# Patient Record
Sex: MALE | Race: BLACK OR AFRICAN AMERICAN | NOT HISPANIC OR LATINO | URBAN - METROPOLITAN AREA
[De-identification: names, ages, dates, MRNs, and addresses within clinical notes are randomized per-mention and may not be internally consistent; named-entity substitution may affect disease eponyms.]

---

## 2018-03-14 ENCOUNTER — EMERGENCY (EMERGENCY)
Facility: HOSPITAL | Age: 38
LOS: 1 days | Discharge: HOME | End: 2018-03-14
Attending: EMERGENCY MEDICINE
Payer: COMMERCIAL

## 2018-03-14 VITALS
WEIGHT: 205.03 LBS | OXYGEN SATURATION: 95 % | RESPIRATION RATE: 17 BRPM | HEART RATE: 83 BPM | SYSTOLIC BLOOD PRESSURE: 145 MMHG | TEMPERATURE: 98 F | DIASTOLIC BLOOD PRESSURE: 97 MMHG

## 2018-03-14 LAB
ALBUMIN SERPL ELPH-MCNC: 3.8 G/DL — SIGNIFICANT CHANGE UP (ref 3.5–5)
ALP SERPL-CCNC: 88 U/L — SIGNIFICANT CHANGE UP (ref 40–120)
ALT FLD-CCNC: 22 U/L DA — SIGNIFICANT CHANGE UP (ref 10–60)
ANION GAP SERPL CALC-SCNC: 7 MMOL/L — SIGNIFICANT CHANGE UP (ref 5–17)
AST SERPL-CCNC: 14 U/L — SIGNIFICANT CHANGE UP (ref 10–40)
BASOPHILS # BLD AUTO: 0.1 K/UL — SIGNIFICANT CHANGE UP (ref 0–0.2)
BASOPHILS NFR BLD AUTO: 1.8 % — SIGNIFICANT CHANGE UP (ref 0–2)
BUN SERPL-MCNC: 18 MG/DL — SIGNIFICANT CHANGE UP (ref 7–18)
CALCIUM SERPL-MCNC: 8.9 MG/DL — SIGNIFICANT CHANGE UP (ref 8.4–10.5)
CHLORIDE SERPL-SCNC: 103 MMOL/L — SIGNIFICANT CHANGE UP (ref 96–108)
CO2 SERPL-SCNC: 28 MMOL/L — SIGNIFICANT CHANGE UP (ref 22–31)
CREAT SERPL-MCNC: 1.16 MG/DL — SIGNIFICANT CHANGE UP (ref 0.5–1.3)
EOSINOPHIL # BLD AUTO: 0.1 K/UL — SIGNIFICANT CHANGE UP (ref 0–0.5)
EOSINOPHIL NFR BLD AUTO: 1.2 % — SIGNIFICANT CHANGE UP (ref 0–6)
GLUCOSE SERPL-MCNC: 306 MG/DL — HIGH (ref 70–99)
HCT VFR BLD CALC: 45.4 % — SIGNIFICANT CHANGE UP (ref 39–50)
HGB BLD-MCNC: 14.9 G/DL — SIGNIFICANT CHANGE UP (ref 13–17)
LIDOCAIN IGE QN: 139 U/L — SIGNIFICANT CHANGE UP (ref 73–393)
LYMPHOCYTES # BLD AUTO: 2.7 K/UL — SIGNIFICANT CHANGE UP (ref 1–3.3)
LYMPHOCYTES # BLD AUTO: 43.8 % — SIGNIFICANT CHANGE UP (ref 13–44)
MCHC RBC-ENTMCNC: 29.1 PG — SIGNIFICANT CHANGE UP (ref 27–34)
MCHC RBC-ENTMCNC: 32.8 GM/DL — SIGNIFICANT CHANGE UP (ref 32–36)
MCV RBC AUTO: 88.8 FL — SIGNIFICANT CHANGE UP (ref 80–100)
MONOCYTES # BLD AUTO: 0.3 K/UL — SIGNIFICANT CHANGE UP (ref 0–0.9)
MONOCYTES NFR BLD AUTO: 4.8 % — SIGNIFICANT CHANGE UP (ref 2–14)
NEUTROPHILS # BLD AUTO: 3 K/UL — SIGNIFICANT CHANGE UP (ref 1.8–7.4)
NEUTROPHILS NFR BLD AUTO: 48.4 % — SIGNIFICANT CHANGE UP (ref 43–77)
PLATELET # BLD AUTO: 300 K/UL — SIGNIFICANT CHANGE UP (ref 150–400)
POTASSIUM SERPL-MCNC: 3.9 MMOL/L — SIGNIFICANT CHANGE UP (ref 3.5–5.3)
POTASSIUM SERPL-SCNC: 3.9 MMOL/L — SIGNIFICANT CHANGE UP (ref 3.5–5.3)
PROT SERPL-MCNC: 7.3 G/DL — SIGNIFICANT CHANGE UP (ref 6–8.3)
RBC # BLD: 5.11 M/UL — SIGNIFICANT CHANGE UP (ref 4.2–5.8)
RBC # FLD: 11.4 % — SIGNIFICANT CHANGE UP (ref 10.3–14.5)
SODIUM SERPL-SCNC: 138 MMOL/L — SIGNIFICANT CHANGE UP (ref 135–145)
WBC # BLD: 6.3 K/UL — SIGNIFICANT CHANGE UP (ref 3.8–10.5)
WBC # FLD AUTO: 6.3 K/UL — SIGNIFICANT CHANGE UP (ref 3.8–10.5)

## 2018-03-14 PROCEDURE — 83690 ASSAY OF LIPASE: CPT

## 2018-03-14 PROCEDURE — 99284 EMERGENCY DEPT VISIT MOD MDM: CPT

## 2018-03-14 PROCEDURE — 80053 COMPREHEN METABOLIC PANEL: CPT

## 2018-03-14 PROCEDURE — 85027 COMPLETE CBC AUTOMATED: CPT

## 2018-03-14 PROCEDURE — 99284 EMERGENCY DEPT VISIT MOD MDM: CPT | Mod: 25

## 2018-03-14 PROCEDURE — 96374 THER/PROPH/DIAG INJ IV PUSH: CPT

## 2018-03-14 RX ORDER — SODIUM CHLORIDE 9 MG/ML
1000 INJECTION INTRAMUSCULAR; INTRAVENOUS; SUBCUTANEOUS
Qty: 0 | Refills: 0 | Status: DISCONTINUED | OUTPATIENT
Start: 2018-03-14 | End: 2018-03-18

## 2018-03-14 RX ORDER — ONDANSETRON 8 MG/1
4 TABLET, FILM COATED ORAL ONCE
Qty: 0 | Refills: 0 | Status: COMPLETED | OUTPATIENT
Start: 2018-03-14 | End: 2018-03-14

## 2018-03-14 RX ORDER — SODIUM CHLORIDE 9 MG/ML
3 INJECTION INTRAMUSCULAR; INTRAVENOUS; SUBCUTANEOUS ONCE
Qty: 0 | Refills: 0 | Status: COMPLETED | OUTPATIENT
Start: 2018-03-14 | End: 2018-03-14

## 2018-03-14 RX ORDER — SODIUM CHLORIDE 9 MG/ML
1000 INJECTION INTRAMUSCULAR; INTRAVENOUS; SUBCUTANEOUS ONCE
Qty: 0 | Refills: 0 | Status: COMPLETED | OUTPATIENT
Start: 2018-03-14 | End: 2018-03-14

## 2018-03-14 RX ADMIN — SODIUM CHLORIDE 125 MILLILITER(S): 9 INJECTION INTRAMUSCULAR; INTRAVENOUS; SUBCUTANEOUS at 23:54

## 2018-03-14 RX ADMIN — SODIUM CHLORIDE 3 MILLILITER(S): 9 INJECTION INTRAMUSCULAR; INTRAVENOUS; SUBCUTANEOUS at 23:54

## 2018-03-14 RX ADMIN — SODIUM CHLORIDE 1000 MILLILITER(S): 9 INJECTION INTRAMUSCULAR; INTRAVENOUS; SUBCUTANEOUS at 23:54

## 2018-03-14 RX ADMIN — ONDANSETRON 4 MILLIGRAM(S): 8 TABLET, FILM COATED ORAL at 23:54

## 2018-03-14 NOTE — ED PROVIDER NOTE - MEDICAL DECISION MAKING DETAILS
12:20a- Pt feels better, N/V resolved, pt drank fluids, no vomitng, no abdominal pain. Pt is well appearing walking with normal gait, stable for discharge and follow up with medical doctor. Pt educated on care and need for follow up. Discussed anticipatory guidance and return precautions. Questions answered. I had a detailed discussion with the patient and/or guardian regarding the historical points, exam findings, and any diagnostic results supporting the discharge diagnosis..

## 2018-03-14 NOTE — ED PROVIDER NOTE - OBJECTIVE STATEMENT
38 y/o M pt with no significant PMHx presents to ED c/o vomiting x tonight since 1900; last episode 1 hour PTA. Pt denies recent outside [place] travels, sick contacts or known spoiled food consumption. Pt also denies fever, chills, shortness of breath, cough, chest pain, palpitations, diarrhea, abd pain, dysuria, urinary frequency, hematuria, or any other complaints. NKDA.

## 2018-03-14 NOTE — ED PROVIDER NOTE - CHPI ED SYMPTOMS NEG
no fever, no chills, no shortness of breath, no cough, no chest pain, no palpitations, no diarrhea, no abd pain, no dysuria, no urinary frequency, no hematuria

## 2018-03-15 VITALS
SYSTOLIC BLOOD PRESSURE: 108 MMHG | DIASTOLIC BLOOD PRESSURE: 69 MMHG | TEMPERATURE: 98 F | RESPIRATION RATE: 17 BRPM | HEART RATE: 60 BPM | OXYGEN SATURATION: 97 %

## 2018-03-15 LAB — BILIRUB SERPL-MCNC: 0.3 MG/DL — SIGNIFICANT CHANGE UP (ref 0.2–1.2)

## 2018-03-15 RX ORDER — ONDANSETRON 8 MG/1
1 TABLET, FILM COATED ORAL
Qty: 12 | Refills: 0
Start: 2018-03-15

## 2018-07-16 NOTE — ED PEDIATRIC NURSE NOTE - NSSISCREENINGQ1_ED_A_ED
Not due for a refill.     levothyroxine (SYNTHROID/LEVOTHROID) 175 MCG tablet was filled on 7/1/2018, qty 90 with 3 refills.    No

## 2021-06-16 ENCOUNTER — INPATIENT (INPATIENT)
Facility: HOSPITAL | Age: 41
LOS: 4 days | Discharge: HOME | DRG: 580 | End: 2021-06-21
Attending: INTERNAL MEDICINE | Admitting: INTERNAL MEDICINE
Payer: COMMERCIAL

## 2021-06-16 VITALS
HEART RATE: 74 BPM | WEIGHT: 192.9 LBS | RESPIRATION RATE: 18 BRPM | TEMPERATURE: 99 F | SYSTOLIC BLOOD PRESSURE: 142 MMHG | OXYGEN SATURATION: 99 % | HEIGHT: 69 IN | DIASTOLIC BLOOD PRESSURE: 95 MMHG

## 2021-06-16 DIAGNOSIS — E11.65 TYPE 2 DIABETES MELLITUS WITH HYPERGLYCEMIA: ICD-10-CM

## 2021-06-16 DIAGNOSIS — Z29.9 ENCOUNTER FOR PROPHYLACTIC MEASURES, UNSPECIFIED: ICD-10-CM

## 2021-06-16 DIAGNOSIS — L03.221 CELLULITIS OF NECK: ICD-10-CM

## 2021-06-16 DIAGNOSIS — L03.90 CELLULITIS, UNSPECIFIED: ICD-10-CM

## 2021-06-16 LAB
ALBUMIN SERPL ELPH-MCNC: 3.2 G/DL — LOW (ref 3.5–5)
ALP SERPL-CCNC: 105 U/L — SIGNIFICANT CHANGE UP (ref 40–120)
ALT FLD-CCNC: 47 U/L DA — SIGNIFICANT CHANGE UP (ref 10–60)
ANION GAP SERPL CALC-SCNC: 8 MMOL/L — SIGNIFICANT CHANGE UP (ref 5–17)
APPEARANCE UR: CLEAR — SIGNIFICANT CHANGE UP
APTT BLD: 30.8 SEC — SIGNIFICANT CHANGE UP (ref 27.5–35.5)
AST SERPL-CCNC: 26 U/L — SIGNIFICANT CHANGE UP (ref 10–40)
BACTERIA # UR AUTO: ABNORMAL /HPF
BASOPHILS # BLD AUTO: 0.05 K/UL — SIGNIFICANT CHANGE UP (ref 0–0.2)
BASOPHILS NFR BLD AUTO: 0.4 % — SIGNIFICANT CHANGE UP (ref 0–2)
BILIRUB SERPL-MCNC: 0.6 MG/DL — SIGNIFICANT CHANGE UP (ref 0.2–1.2)
BILIRUB UR-MCNC: NEGATIVE — SIGNIFICANT CHANGE UP
BUN SERPL-MCNC: 7 MG/DL — SIGNIFICANT CHANGE UP (ref 7–18)
CALCIUM SERPL-MCNC: 9.2 MG/DL — SIGNIFICANT CHANGE UP (ref 8.4–10.5)
CHLORIDE SERPL-SCNC: 102 MMOL/L — SIGNIFICANT CHANGE UP (ref 96–108)
CO2 SERPL-SCNC: 27 MMOL/L — SIGNIFICANT CHANGE UP (ref 22–31)
COLOR SPEC: YELLOW — SIGNIFICANT CHANGE UP
CREAT SERPL-MCNC: 0.77 MG/DL — SIGNIFICANT CHANGE UP (ref 0.5–1.3)
DIFF PNL FLD: NEGATIVE — SIGNIFICANT CHANGE UP
EOSINOPHIL # BLD AUTO: 0.15 K/UL — SIGNIFICANT CHANGE UP (ref 0–0.5)
EOSINOPHIL NFR BLD AUTO: 1.2 % — SIGNIFICANT CHANGE UP (ref 0–6)
EPI CELLS # UR: SIGNIFICANT CHANGE UP /HPF
GLUCOSE BLDC GLUCOMTR-MCNC: 324 MG/DL — HIGH (ref 70–99)
GLUCOSE SERPL-MCNC: 259 MG/DL — HIGH (ref 70–99)
GLUCOSE UR QL: 1000 MG/DL
HCT VFR BLD CALC: 39.8 % — SIGNIFICANT CHANGE UP (ref 39–50)
HGB BLD-MCNC: 13.7 G/DL — SIGNIFICANT CHANGE UP (ref 13–17)
IMM GRANULOCYTES NFR BLD AUTO: 0.4 % — SIGNIFICANT CHANGE UP (ref 0–1.5)
INR BLD: 1.05 RATIO — SIGNIFICANT CHANGE UP (ref 0.88–1.16)
KETONES UR-MCNC: ABNORMAL
LACTATE SERPL-SCNC: 0.9 MMOL/L — SIGNIFICANT CHANGE UP (ref 0.7–2)
LEUKOCYTE ESTERASE UR-ACNC: NEGATIVE — SIGNIFICANT CHANGE UP
LYMPHOCYTES # BLD AUTO: 19.4 % — SIGNIFICANT CHANGE UP (ref 13–44)
LYMPHOCYTES # BLD AUTO: 2.49 K/UL — SIGNIFICANT CHANGE UP (ref 1–3.3)
MCHC RBC-ENTMCNC: 28.8 PG — SIGNIFICANT CHANGE UP (ref 27–34)
MCHC RBC-ENTMCNC: 34.4 GM/DL — SIGNIFICANT CHANGE UP (ref 32–36)
MCV RBC AUTO: 83.6 FL — SIGNIFICANT CHANGE UP (ref 80–100)
MONOCYTES # BLD AUTO: 0.87 K/UL — SIGNIFICANT CHANGE UP (ref 0–0.9)
MONOCYTES NFR BLD AUTO: 6.8 % — SIGNIFICANT CHANGE UP (ref 2–14)
NEUTROPHILS # BLD AUTO: 9.23 K/UL — HIGH (ref 1.8–7.4)
NEUTROPHILS NFR BLD AUTO: 71.8 % — SIGNIFICANT CHANGE UP (ref 43–77)
NITRITE UR-MCNC: NEGATIVE — SIGNIFICANT CHANGE UP
NRBC # BLD: 0 /100 WBCS — SIGNIFICANT CHANGE UP (ref 0–0)
PH UR: 5 — SIGNIFICANT CHANGE UP (ref 5–8)
PLATELET # BLD AUTO: 306 K/UL — SIGNIFICANT CHANGE UP (ref 150–400)
POTASSIUM SERPL-MCNC: 4 MMOL/L — SIGNIFICANT CHANGE UP (ref 3.5–5.3)
POTASSIUM SERPL-SCNC: 4 MMOL/L — SIGNIFICANT CHANGE UP (ref 3.5–5.3)
PROT SERPL-MCNC: 7.5 G/DL — SIGNIFICANT CHANGE UP (ref 6–8.3)
PROT UR-MCNC: 15
PROTHROM AB SERPL-ACNC: 12.5 SEC — SIGNIFICANT CHANGE UP (ref 10.6–13.6)
RBC # BLD: 4.76 M/UL — SIGNIFICANT CHANGE UP (ref 4.2–5.8)
RBC # FLD: 11.3 % — SIGNIFICANT CHANGE UP (ref 10.3–14.5)
RBC CASTS # UR COMP ASSIST: SIGNIFICANT CHANGE UP /HPF (ref 0–2)
SARS-COV-2 RNA SPEC QL NAA+PROBE: SIGNIFICANT CHANGE UP
SODIUM SERPL-SCNC: 137 MMOL/L — SIGNIFICANT CHANGE UP (ref 135–145)
SP GR SPEC: 1.01 — SIGNIFICANT CHANGE UP (ref 1.01–1.02)
UROBILINOGEN FLD QL: NEGATIVE — SIGNIFICANT CHANGE UP
WBC # BLD: 12.84 K/UL — HIGH (ref 3.8–10.5)
WBC # FLD AUTO: 12.84 K/UL — HIGH (ref 3.8–10.5)
WBC UR QL: SIGNIFICANT CHANGE UP /HPF (ref 0–5)

## 2021-06-16 PROCEDURE — 99284 EMERGENCY DEPT VISIT MOD MDM: CPT

## 2021-06-16 PROCEDURE — 70491 CT SOFT TISSUE NECK W/DYE: CPT | Mod: 26,MA

## 2021-06-16 PROCEDURE — 99223 1ST HOSP IP/OBS HIGH 75: CPT | Mod: GC

## 2021-06-16 RX ORDER — INSULIN GLARGINE 100 [IU]/ML
15 INJECTION, SOLUTION SUBCUTANEOUS AT BEDTIME
Refills: 0 | Status: DISCONTINUED | OUTPATIENT
Start: 2021-06-16 | End: 2021-06-17

## 2021-06-16 RX ORDER — AMPICILLIN SODIUM AND SULBACTAM SODIUM 250; 125 MG/ML; MG/ML
3 INJECTION, POWDER, FOR SUSPENSION INTRAMUSCULAR; INTRAVENOUS EVERY 6 HOURS
Refills: 0 | Status: DISCONTINUED | OUTPATIENT
Start: 2021-06-16 | End: 2021-06-18

## 2021-06-16 RX ORDER — INSULIN LISPRO 100/ML
4 VIAL (ML) SUBCUTANEOUS
Refills: 0 | Status: DISCONTINUED | OUTPATIENT
Start: 2021-06-16 | End: 2021-06-18

## 2021-06-16 RX ORDER — INSULIN LISPRO 100/ML
VIAL (ML) SUBCUTANEOUS
Refills: 0 | Status: DISCONTINUED | OUTPATIENT
Start: 2021-06-16 | End: 2021-06-18

## 2021-06-16 RX ORDER — VANCOMYCIN HCL 1 G
1000 VIAL (EA) INTRAVENOUS ONCE
Refills: 0 | Status: COMPLETED | OUTPATIENT
Start: 2021-06-16 | End: 2021-06-16

## 2021-06-16 RX ORDER — KETOROLAC TROMETHAMINE 30 MG/ML
30 SYRINGE (ML) INJECTION ONCE
Refills: 0 | Status: DISCONTINUED | OUTPATIENT
Start: 2021-06-16 | End: 2021-06-16

## 2021-06-16 RX ORDER — INSULIN LISPRO 100/ML
VIAL (ML) SUBCUTANEOUS AT BEDTIME
Refills: 0 | Status: DISCONTINUED | OUTPATIENT
Start: 2021-06-16 | End: 2021-06-18

## 2021-06-16 RX ORDER — PIPERACILLIN AND TAZOBACTAM 4; .5 G/20ML; G/20ML
3.38 INJECTION, POWDER, LYOPHILIZED, FOR SOLUTION INTRAVENOUS ONCE
Refills: 0 | Status: COMPLETED | OUTPATIENT
Start: 2021-06-16 | End: 2021-06-16

## 2021-06-16 RX ADMIN — Medication 2: at 22:02

## 2021-06-16 RX ADMIN — INSULIN GLARGINE 15 UNIT(S): 100 INJECTION, SOLUTION SUBCUTANEOUS at 22:01

## 2021-06-16 RX ADMIN — PIPERACILLIN AND TAZOBACTAM 200 GRAM(S): 4; .5 INJECTION, POWDER, LYOPHILIZED, FOR SOLUTION INTRAVENOUS at 11:45

## 2021-06-16 RX ADMIN — Medication 250 MILLIGRAM(S): at 15:29

## 2021-06-16 RX ADMIN — Medication 30 MILLIGRAM(S): at 15:28

## 2021-06-16 NOTE — H&P ADULT - ASSESSMENT
Patient is a 41 year old male with PMHx of T2DM (not on medications) who is coming with complaints of posterior neck swelling, pain and discharge for the past 3 days, admitted for cellulitis likely 2/2 to community acquired MRSA infection.     In the ED,  VS noted to be: 99.3F, HR 74bpm, BP: 142/95 mmHg, RR 18, Spo2 99% on RA   Labs significant for BS: 259, lactate 0.9, WBC 12.84   CT Neck done showing dorsal neck cellulitis, and myositis; no abscess at this time  Patient is a 41 year old male with PMHx of T2DM (not on medications) who is coming with complaints of posterior neck swelling, pain and discharge for the past 3 days, admitted for cellulitis     In the ED,  VS noted to be: 99.3F, HR 74bpm, BP: 142/95 mmHg, RR 18, Spo2 99% on RA   Labs significant for BS: 259, lactate 0.9, WBC 12.84   CT Neck done showing dorsal neck cellulitis, and myositis; no abscess at this time

## 2021-06-16 NOTE — ED PROVIDER NOTE - CLINICAL SUMMARY MEDICAL DECISION MAKING FREE TEXT BOX
40 Y/o male with R posterior neck swelling, concern for abscess,  nontoxic . Concern for deep space infection, will do ct imaging of neck, labs , and reassess

## 2021-06-16 NOTE — ED ADULT TRIAGE NOTE - NS ED TRIAGE AVPU SCALE
Alert-The patient is alert, awake and responds to voice. The patient is oriented to time, place, and person. The triage nurse is able to obtain subjective information.
- - -

## 2021-06-16 NOTE — H&P ADULT - ATTENDING COMMENTS
Patient seen and examined with PGY1 MAR.    Vital Signs Last 24 Hrs  T(C): 37.7 (16 Jun 2021 15:29), Max: 37.7 (16 Jun 2021 15:29)  T(F): 99.9 (16 Jun 2021 15:29), Max: 99.9 (16 Jun 2021 15:29)  HR: 83 (16 Jun 2021 15:29) (74 - 83)  BP: 164/64 (16 Jun 2021 15:29) (142/95 - 164/64)  RR: 17 (16 Jun 2021 15:29) (17 - 18)  SpO2: 98% (16 Jun 2021 15:29) (98% - 99%)    P/E:  Middle aged male, comfortable at rest, NAD  HEENT: Posterior head/ neck hard indurated area with opening draining pus  CVS: S1S2 present, regular  Resp: BLAE+, No wheeze or Rhonchi  GI: Soft, BS+, NT  Extr: No edema or calf tenderness    Labs:                        13.7   12.84 )-----------( 306      ( 16 Jun 2021 10:36 )             39.8   06-16    137  |  102  |  7   ----------------------------<  259<H>  4.0   |  27  |  0.77    Ca    9.2      16 Jun 2021 10:36    TPro  7.5  /  Alb  3.2<L>  /  TBili  0.6  /  DBili  x   /  AST  26  /  ALT  47  /  AlkPhos  105  06-16    < from: CT Neck Soft Tissue w/ IV Cont (06.16.21 @ 11:22) >    IMPRESSION: Dorsal neck cellulitis and myositis. No evidence for abscess at this time. Hypoattenuation of the thickened overlying skin, which could represent phlegmonous change. Patient seen and examined with PGY1 MAR Dr. Morrow.     41 year old male with PMHx of T2DM (not on medications) who is coming with complaints of posterior neck swelling, pain and discharge for the past 3 days, He has Hx DM x 5 yrs was on meds Metformin but according to patient PCP discontinued after he was doing Diet and Exercise. He has not seen a Physician in long time. Has these type of eruptions but never needed antibiotics. Works in construction.     ROS: As above  FH: Not contributory to current presentation  SH: Non smoker, alcohol socially as per pat. once a week or so.     Vital Signs Last 24 Hrs  T(C): 37.7 (16 Jun 2021 15:29), Max: 37.7 (16 Jun 2021 15:29)  T(F): 99.9 (16 Jun 2021 15:29), Max: 99.9 (16 Jun 2021 15:29)  HR: 83 (16 Jun 2021 15:29) (74 - 83)  BP: 164/64 (16 Jun 2021 15:29) (142/95 - 164/64)  RR: 17 (16 Jun 2021 15:29) (17 - 18)  SpO2: 98% (16 Jun 2021 15:29) (98% - 99%)    P/E:  Middle aged male, comfortable at rest, NAD  HEENT: Posterior head/ neck hard indurated area with opening draining pus  CVS: S1S2 present, regular  Resp: BLAE+, No wheeze or Rhonchi  GI: Soft, BS+, NT  Extr: No edema or calf tenderness    Labs:                        13.7   12.84 )-----------( 306      ( 16 Jun 2021 10:36 )             39.8   06-16    137  |  102  |  7   ----------------------------<  259<H>  4.0   |  27  |  0.77    Ca    9.2      16 Jun 2021 10:36    TPro  7.5  /  Alb  3.2<L>  /  TBili  0.6  /  DBili  x   /  AST  26  /  ALT  47  /  AlkPhos  105  06-16    CT Neck Soft Tissue w/ IV Cont (06.16.21 @ 11:22)     IMPRESSION: Dorsal neck cellulitis and myositis. No evidence for abscess at this time. Hypoattenuation of the thickened overlying skin, which could represent phlegmonous change.    D/D:  Infected Carbuncle with superimposed Cellulitis and Myositis  Uncontrolled Type 2 DM with Hyperglycemia  Non compliance with medical Rx    Plan:  Medicine; Transfer to [address] [place] [address]  IV ABX; consider IV Vanco to cover MRSA  Discussed with ID Dr. Roberts; Evaluated patient and recommended to place on Unasyn for now.  Start Lantus 15 units HS plus Insulin Lispro 3 units with meals to reduce sugars as I expect A12c more than 10%  A1C, TSH   Surgical Consult d/w PA; F/U Dr. Johnston recommendation  Endo consult    Based on sugar control next 48 hrs will consider oral hypoglycemics on discharge if Endo agrees  DVT ppx  Discussed with JEREMIAH Morrow and ID and Sx

## 2021-06-16 NOTE — H&P ADULT - NSHPPHYSICALEXAM_GEN_ALL_CORE
GENERAL APPEARANCE: Well developed, AA0x3, in NAD   THROAT: Oral cavity and pharynx normal. No inflammation, swelling, exudate, or lesions.  CARDIAC: Normal S1 and S2. No S3, S4 or murmurs. Rhythm is regular. There is no peripheral edema, cyanosis or pallor.  LUNGS: Clear to auscultation and percussion without rales, rhonchi, wheezing or diminished breath sounds.  ABDOMEN: Positive bowel sounds. Soft, nondistended, nontender. No guarding or rebound. No masses.  EXTREMITIES: No significant deformity or joint abnormality. No edema. Peripheral pulses intact. No varicosities.  NEUROLOGICAL: CN II-XII intact. Strength and sensation symmetric and intact throughout. Reflexes 2+ throughout.   SKIN: Skin normal color, texture and turgor with no lesions or eruptions. GENERAL APPEARANCE: Well developed, AA0x3, in NAD   THROAT: Oral cavity and pharynx normal. No inflammation, swelling, exudate, or lesions.  CARDIAC: Normal S1 and S2. No S3, S4 or murmurs. Rhythm is regular. There is no peripheral edema, cyanosis or pallor.  LUNGS: Clear to auscultation and percussion without rales, rhonchi, wheezing or diminished breath sounds.  ABDOMEN: Positive bowel sounds. Soft, nondistended, nontender. No guarding or rebound. No masses.  EXTREMITIES: No significant deformity or joint abnormality. No edema. Peripheral pulses intact. No varicosities.  NEUROLOGICAL: CN II-XII intact. Strength and sensation symmetric and intact throughout. Reflexes 2+ throughout.   SKIN: Posterior aspect of the neck showed large pustule with satellite lesions and surrounding indurated skin with  no erythema.

## 2021-06-16 NOTE — H&P ADULT - PROBLEM SELECTOR PLAN 1
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 Roberts   - Surgery Dr. Solis consulted

## 2021-06-16 NOTE — CONSULT NOTE ADULT - SKIN COMMENTS
thickening of skin over the nape of his neck with a nodular appearance  along with some purulent drainage from a small ulcerated region and has warmth , redness and induration and tenderness of the rest of the area.

## 2021-06-16 NOTE — ED PROVIDER NOTE - OBJECTIVE STATEMENT
41 y.o male with no PMhx preents to the ED c/o x3 days of swelling and pain to the R side of the back of the neck. Patient endorses it is worse with movement and swelling makes it difficult to move neck. Patient endorses slight puss from area. Patient denies any history of abscess or shingles. Patient denies any fever, chills, trouble swallowing, radiation or any other acute complaints. NKDA

## 2021-06-16 NOTE — ED PROVIDER NOTE - PROGRESS NOTE DETAILS
Pt with superficial abscess already draining pus. Mother at bedside. Pt relates diabetes now but not on meds. No PMD. Given cellulitis with myositis pt merits IV abx to ensure improvement in symptoms. Pt agreed to admission.

## 2021-06-16 NOTE — ED PROVIDER NOTE - CARE PLAN
Principal Discharge DX:	Cellulitis  Secondary Diagnosis:	Myositis  Secondary Diagnosis:	Uncontrolled diabetes mellitus

## 2021-06-16 NOTE — H&P ADULT - PROBLEM SELECTOR PLAN 2
- Pt has history of uncontrolled DM not on any medications   - F/u A1c  - Start lower than weight based insulin regimen as insulin naive ; lantus 15U HS, and 4 units Premeal admelog   - Acccarlo ACHS   - CC diet - Pt has history of uncontrolled DM not on any medications   - F/u A1c  - Start lower than weight based insulin regimen as insulin naive ; lantus 15U HS, and 4 units Premeal admelog   - Accuchecks ACHS   - CC diet  - Endo Dr. Ahmadi

## 2021-06-16 NOTE — H&P ADULT - NSHPREVIEWOFSYSTEMS_GEN_ALL_CORE
GENERAL: No fatigue, No Wt gain, No Wt Loss, No Fever, No Chills, ,   EYES: No blurry vision, No glasses, ,   ENT: No hearing loss, No aides, No dentures, No sore throat,   CARDIAC: No dizziness, No palpitations, No pedal edema, No PND (Paryoxysmal Nocturnal Dyspnea), No Syncope, No Claudication (pain lower extremities),   RESP: No cough, No sputum, No SOB, No Wheezing, No O2 use,   GI: No Pain, No Reflux, No Anorexia, No Dysphagia, No Constipation, No Diarrhea, No Nausea, No Vomiting, No Bleeding,   : No dysuria, No Hematuria, No Nocturia, No Incontinence, No Urine Retention, No Caro,   HEME: No Easy Bruising, No Lymphadenopathy,   M/S: No Arthralgias, No Back Pain, No Myalgias, No Walker, No Wheelchair,   NEURO: No Weakness, No HA, No Tremors, No Falls, No Neuropathy, No Vertigo,   PSYCH: No Anxiety, No Depression, No Insomnia,   ENDO: ,   SKIN: No Rash, No Wounds, GENERAL: Fatigue + No Wt gain, No Wt Loss, No Fever, No Chills, ,   EYES: No blurry vision  ENT: No hearing loss, No sore throat,   CARDIAC: No dizziness, No palpitations, No pedal edema, No PND, No Syncope, No Claudication  RESP: No cough, No sputum, No SOB, No Wheezing, No O2 use,   GI: No Pain, No Reflux, No Anorexia, No Dysphagia, No Constipation, No Diarrhea, No Nausea, No Vomiting, No Bleeding,   : No dysuria, No Hematuria, No Nocturia, No Incontinence, No Urine Retention  HEME: No Easy Bruising, No Lymphadenopathy,   M/S: Neck Pain + No Arthralgias, No Back Pain, No Myalgias  NEURO: No Weakness, No HA, No Tremors, No Falls, No Neuropathy, No Vertigo,   PSYCH: No Anxiety, No Depression, No Insomnia,   ENDO: Uncontrolled DM   SKIN: Neck pain, and discharge +  No Rash, No Wounds

## 2021-06-16 NOTE — CONSULT NOTE ADULT - SUBJECTIVE AND OBJECTIVE BOX
HPI:  Patient is a 41 year old male with PMHx of T2DM (not on medications) who is coming with complaints of posterior neck swelling, pain and discharge for the past 3 days. He did not have any recent trauma to the area, but does get shaves/haircuts in the area. The area surrounding the boil and discharge is hard, and the pain is worsened on movement of the neck. He endorses taking tylenol and placing 'hot rags' on the area to no relief. He endorses that he has previously had multiple 'bumps' that occurred on the back of his head which often times drain yellow or red fluid and self resolve. This has occurred numerous times to the extent that hair no longer grows in the posterior aspect of his head. He has never taken antibiotics for these complaints. This was not associated with fever, chills, rigors, headache, visual disturbances, nausea, vomiting, dysphagia, chest pain, or shortness of breath. Of Note: Patient was diagnosed with diabetes 4 years ago and started on metformin initially but discontinued.  (2021 18:31)      PAST MEDICAL & SURGICAL HISTORY:  Diabetes  Diagnosed 4 years ago. Previously on Metformin, discontinued and on &quot;diet control&quot;.    No significant past surgical history        No Known Allergies      Meds:  insulin glargine Injectable (LANTUS) 15 Unit(s) SubCutaneous at bedtime  insulin lispro (ADMELOG) corrective regimen sliding scale   SubCutaneous three times a day before meals  insulin lispro (ADMELOG) corrective regimen sliding scale   SubCutaneous at bedtime  insulin lispro Injectable (ADMELOG) 4 Unit(s) SubCutaneous three times a day before meals      SOCIAL HISTORY:  Smoker:  YES / NO        PACK YEARS:                         WHEN QUIT?  ETOH use:  YES / NO               FREQUENCY / QUANTITY:  Ilicit Drug use:  YES / NO  Occupation:  Assisted device use (Cane / Walker):  Live with:    FAMILY HISTORY:      VITALS:  Vital Signs Last 24 Hrs  T(C): 37.6 (2021 19:09), Max: 37.7 (2021 15:29)  T(F): 99.7 (2021 19:09), Max: 99.9 (2021 15:29)  HR: 65 (2021 19:09) (65 - 83)  BP: 121/80 (2021 19:09) (121/80 - 164/64)  BP(mean): --  RR: 17 (2021 19:09) (17 - 18)  SpO2: 99% (2021 19:09) (98% - 99%)    LABS/DIAGNOSTIC TESTS:                          13.7   12.84 )-----------( 306      ( 2021 10:36 )             39.8     WBC Count: 12.84 K/uL ( @ 10:36)          137  |  102  |  7   ----------------------------<  259<H>  4.0   |  27  |  0.77    Ca    9.2      2021 10:36    TPro  7.5  /  Alb  3.2<L>  /  TBili  0.6  /  DBili  x   /  AST  26  /  ALT  47  /  AlkPhos  105        Urinalysis Basic - ( 2021 13:19 )    Color: Yellow / Appearance: Clear / S.015 / pH: x  Gluc: x / Ketone: Moderate  / Bili: Negative / Urobili: Negative   Blood: x / Protein: 15 / Nitrite: Negative   Leuk Esterase: Negative / RBC: 0-2 /HPF / WBC 3-5 /HPF   Sq Epi: x / Non Sq Epi: Few /HPF / Bacteria: Trace /HPF        LIVER FUNCTIONS - ( 2021 10:36 )  Alb: 3.2 g/dL / Pro: 7.5 g/dL / ALK PHOS: 105 U/L / ALT: 47 U/L DA / AST: 26 U/L / GGT: x             PT/INR - ( 2021 10:36 )   PT: 12.5 sec;   INR: 1.05 ratio         PTT - ( 2021 10:36 )  PTT:30.8 sec    LACTATE:Lactate, Blood: 0.9 mmol/L ( @ 13:10)      ABG -     CULTURES:       RADIOLOGY:< from: CT Neck Soft Tissue w/ IV Cont (21 @ 11:22) >  EXAM:  CT NECK SOFT TISSUE IC                            PROCEDURE DATE:  2021          INTERPRETATION:  CT EXAMINATION OF THE NECK    CLINICAL INDICATION: Right posterior neck abscess.    TECHNIQUE: Multidetector reformatted CT images of theneck were obtained following the intravenous administration of 90 cc of Omnipaque 350 IV contrast.    COMPARISON: None.    FINDINGS:  Skin thickening, subcutaneous fat stranding as well as muscular edema noted in the dorsal neck/lower occipital scalp. No well-defined rim-enhancing collection is identified. No air locules identified. There is no involvement of the osseous structures/spinal canal.    There is relative hypoattenuation involving thickened right dorsal para midline skin (3-24), which could represent phlegmonous change.    Aerodigestive structures: No evidence of mass or abnormal enhancement.    Lymph nodes: There is right greater than left cervical lymphadenopathy the, largest measuring 1.9 x 1.8 cm in the lower right level V (3-70).    Parotid and submandibular glands:  Normal.    Thyroid gland: Normal.    Vascular structures: Normal.    Osseous structures: No fracture, dislocation or destructive lesion.    Dentition: Caries involving the left maxillary and right mandibular molar teeth.    Paranasal sinuses: Clear.  Mastoid air cells:  Clear.    Partially visualized orbits: Normal    Partially visualized intracranial structures: Normal.    Partially visualized lung apices: Normal.    IMPRESSION:    Dorsal neck cellulitis and myositis. No evidence for abscess at this time. Hypoattenuation of the thickened overlying skin, which could represent phlegmonous change.            YELENA AREVALO M.D., ATTENDING RADIOLOGIST  This document has been electronically signed.  [id number] 11:51AM    < end of copied text >        ROS  [  ] UNABLE TO ELICIT               HPI:  Patient is a 41 year old male with PMHx of T2DM (not on medications) who is coming with complaints of posterior neck swelling, pain and discharge for the past 3 days. He did not have any recent trauma to the area, but does get shaves/haircuts in the area. The area surrounding the boil and discharge is hard, and the pain is worsened on movement of the neck. He endorses taking tylenol and placing 'hot rags' on the area to no relief. He endorses that he has previously had multiple 'bumps' that occurred on the back of his head which often times drain yellow or red fluid and self resolve. This has occurred numerous times to the extent that hair no longer grows in the posterior aspect of his head. He has never taken antibiotics for these complaints. This was not associated with fever, chills, rigors, headache, visual disturbances, nausea, vomiting, dysphagia, chest pain, or shortness of breath. Of Note: Patient was diagnosed with diabetes 4 years ago and started on metformin initially but discontinued.  (2021 18:31)      History as above, pt who presented to the hospital with neck pain , swelling and drainage from his skin x 3 days, he denies any fevers or chills but his temps are as high as 99.9 orally here. He gets "razor Bumps" frequently after he gets his haircut. He has no other complaints at this time.         PAST MEDICAL & SURGICAL HISTORY:  Diabetes  Diagnosed 4 years ago. Previously on Metformin, discontinued and on diet control     No significant past surgical history        No Known Allergies      Meds:  insulin glargine Injectable (LANTUS) 15 Unit(s) SubCutaneous at bedtime  insulin lispro (ADMELOG) corrective regimen sliding scale   SubCutaneous three times a day before meals  insulin lispro (ADMELOG) corrective regimen sliding scale   SubCutaneous at bedtime  insulin lispro Injectable (ADMELOG) 4 Unit(s) SubCutaneous three times a day before meals      SOCIAL HISTORY:  Smoker:  no  ETOH use:  YES, socially  Ilicit Drug use:  NO      FAMILY HISTORY: not contributory      VITALS:  Vital Signs Last 24 Hrs  T(C): 37.6 (2021 19:09), Max: 37.7 (2021 15:29)  T(F): 99.7 (2021 19:09), Max: 99.9 (2021 15:29)  HR: 65 (2021 19:09) (65 - 83)  BP: 121/80 (2021 19:09) (121/80 - 164/64)  BP(mean): --  RR: 17 (2021 19:09) (17 - 18)  SpO2: 99% (2021 19:09) (98% - 99%)    LABS/DIAGNOSTIC TESTS:                          13.7   12.84 )-----------( 306      ( 2021 10:36 )             39.8     WBC Count: 12.84 K/uL ( @ 10:36)          137  |  102  |  7   ----------------------------<  259<H>  4.0   |  27  |  0.77    Ca    9.2      2021 10:36    TPro  7.5  /  Alb  3.2<L>  /  TBili  0.6  /  DBili  x   /  AST  26  /  ALT  47  /  AlkPhos  105        Urinalysis Basic - ( 2021 13:19 )    Color: Yellow / Appearance: Clear / S.015 / pH: x  Gluc: x / Ketone: Moderate  / Bili: Negative / Urobili: Negative   Blood: x / Protein: 15 / Nitrite: Negative   Leuk Esterase: Negative / RBC: 0-2 /HPF / WBC 3-5 /HPF   Sq Epi: x / Non Sq Epi: Few /HPF / Bacteria: Trace /HPF        LIVER FUNCTIONS - ( 2021 10:36 )  Alb: 3.2 g/dL / Pro: 7.5 g/dL / ALK PHOS: 105 U/L / ALT: 47 U/L DA / AST: 26 U/L / GGT: x             PT/INR - ( 2021 10:36 )   PT: 12.5 sec;   INR: 1.05 ratio         PTT - ( 2021 10:36 )  PTT:30.8 sec    LACTATE:Lactate, Blood: 0.9 mmol/L ( @ 13:10)      ABG -     CULTURES:       RADIOLOGY:< from: CT Neck Soft Tissue w/ IV Cont (21 @ 11:22) >  EXAM:  CT NECK SOFT TISSUE IC                            PROCEDURE DATE:  2021          INTERPRETATION:  CT EXAMINATION OF THE NECK    CLINICAL INDICATION: Right posterior neck abscess.    TECHNIQUE: Multidetector reformatted CT images of theneck were obtained following the intravenous administration of 90 cc of Omnipaque 350 IV contrast.    COMPARISON: None.    FINDINGS:  Skin thickening, subcutaneous fat stranding as well as muscular edema noted in the dorsal neck/lower occipital scalp. No well-defined rim-enhancing collection is identified. No air locules identified. There is no involvement of the osseous structures/spinal canal.    There is relative hypoattenuation involving thickened right dorsal para midline skin (3-24), which could represent phlegmonous change.    Aerodigestive structures: No evidence of mass or abnormal enhancement.    Lymph nodes: There is right greater than left cervical lymphadenopathy the, largest measuring 1.9 x 1.8 cm in the lower right level V (3-70).    Parotid and submandibular glands:  Normal.    Thyroid gland: Normal.    Vascular structures: Normal.    Osseous structures: No fracture, dislocation or destructive lesion.    Dentition: Caries involving the left maxillary and right mandibular molar teeth.    Paranasal sinuses: Clear.  Mastoid air cells:  Clear.    Partially visualized orbits: Normal    Partially visualized intracranial structures: Normal.    Partially visualized lung apices: Normal.    IMPRESSION:    Dorsal neck cellulitis and myositis. No evidence for abscess at this time. Hypoattenuation of the thickened overlying skin, which could represent phlegmonous change.            YELENA AREVALO M.D., ATTENDING RADIOLOGIST  This document has been electronically signed.  [id number] 11:51AM    < end of copied text >        ROS  [  ] UNABLE TO ELICIT

## 2021-06-16 NOTE — ED ADULT NURSE NOTE - OBJECTIVE STATEMENT
Pt AOx4, ambulatory, c/o painful abscess, with puss discharge, and rash and blisters around the abscess located on right posterior neck x "a few days". Pt denies fever, n/v/, dizziness, changes in vision. No neuro deficit noted.

## 2021-06-16 NOTE — ED ADULT TRIAGE NOTE - CHIEF COMPLAINT QUOTE
C/o I  have pain in my neck and a bump on my head for a few days. Denies injury. Rash noted to back of head

## 2021-06-16 NOTE — ED ADULT NURSE REASSESSMENT NOTE - NS ED NURSE REASSESS COMMENT FT1
Pt remains stable, AOX4, no s/s of any distress noted. IV line in place, IV fluids infusing as per orders, no signs of infiltration noted. Tolerating diet, pt ate food from home. VS WNL. Call bell in reach, bed in lowest position. Safety maintained, hourly rounding performed. Endorsed to nurse Bola.

## 2021-06-16 NOTE — ED PROVIDER NOTE - PHYSICAL EXAMINATION
Swelling and tenderness with erythema to the R sided posterior neck, with slight purulence with yellow pus , neck is supple

## 2021-06-16 NOTE — CONSULT NOTE ADULT - SUBJECTIVE AND OBJECTIVE BOX
HPI:  Patient is a 41 year old male with PMHx of T2DM (not on medications) who is coming with complaints of posterior neck swelling, pain and discharge for the past 3 days. He did not have any recent trauma to the area, but does get shaves/haircuts in the area. The area surrounding the boil and discharge is hard, and the pain is worsened on movement of the neck. He endorses taking tylenol and placing 'hot rags' on the area to no relief. He endorses that he has previously had multiple 'bumps' that occurred on the back of his head which often times drain yellow or red fluid and self resolve. This has occurred numerous times to the extent that hair no longer grows in the posterior aspect of his head. He has never taken antibiotics for these complaints. This was not associated with fever, chills, rigors, headache, visual disturbances, nausea, vomiting, dysphagia, chest pain, or shortness of breath. Of Note: Patient was diagnosed with diabetes 4 years ago and started on metformin initially but discontinued.  (2021 18:31)    Patient seen and examined at bedside for surgical consul regarding neck swelling. Patient states that he has been having severe swelling with drainage of pus from the site for the past three days. Swelling had been so severe he was unable to turn his head. Admits to multiple small lesions in the same area which were not this severe. Denies fever, chills, chest pain, shortness of breath, nausea, vomiting, diarrhea. States that he had seen dermatology in the past and had injections in the area which helped at the time. Denies medical history.     PAST MEDICAL & SURGICAL HISTORY:  Diabetes  Diagnosed 4 years ago. Previously on Metformin, discontinued and on &quot;diet control&quot;.  No significant past surgical history    Review of Systems: Contained within HPI    MEDICATIONS  (STANDING):  insulin glargine Injectable (LANTUS) 15 Unit(s) SubCutaneous at bedtime  insulin lispro (ADMELOG) corrective regimen sliding scale   SubCutaneous three times a day before meals  insulin lispro (ADMELOG) corrective regimen sliding scale   SubCutaneous at bedtime  insulin lispro Injectable (ADMELOG) 4 Unit(s) SubCutaneous three times a day before meals    MEDICATIONS  (PRN):    Allergies: mushrooms    SOCIAL HISTORY: Denies smoking, drug and ETOH abuse    Vital Signs Last 24 Hrs  T(C): 37.6 (2021 19:09), Max: 37.7 (2021 15:29)  T(F): 99.7 (2021 19:09), Max: 99.9 (2021 15:29)  HR: 65 (2021 19:09) (65 - 83)  BP: 121/80 (2021 19:09) (121/80 - 164/64)  RR: 17 (2021 19:09) (17 - 18)  SpO2: 99% (2021 19:09) (98% - 99%)    Physical Exam:    General:  Appears stated age, well-groomed, well-nourished, no distress  Eyes: EOMI  HENT:  WNL, no JVD; back of head with erythema, edema, pinpoint area with purulent drainage, thickened skin surrounding draining area likely due to chronic inflammation and lesions. Swelling through base of head and neck to posterior ear greater on right then left. Minimal tenderness to palpation  Chest: respirations nonlabored  Cardiovascular:  Regular rate & rhythm  Abdomen:    Extremities: no edema bilaterally  Skin: warm and dry  Musculoskeletal: no calf tenderness  Neuro:  Alert, oriented to time, place and person   Psych: normal affect    LABS:                        13.7   12.84 )-----------( 306      ( 2021 10:36 )             39.8     06-16    137  |  102  |  7   ----------------------------<  259<H>  4.0   |  27  |  0.77    Ca    9.2      2021 10:36    TPro  7.5  /  Alb  3.2<L>  /  TBili  0.6  /  DBili  x   /  AST  26  /  ALT  47  /  AlkPhos  105  06-16    PT/INR - ( 2021 10:36 )   PT: 12.5 sec;   INR: 1.05 ratio      PTT - ( 2021 10:36 )  PTT:30.8 sec  Urinalysis Basic - ( 2021 13:19 )    Color: Yellow / Appearance: Clear / S.015 / pH: x  Gluc: x / Ketone: Moderate  / Bili: Negative / Urobili: Negative   Blood: x / Protein: 15 / Nitrite: Negative   Leuk Esterase: Negative / RBC: 0-2 /HPF / WBC 3-5 /HPF   Sq Epi: x / Non Sq Epi: Few /HPF / Bacteria: Trace /HPF    RADIOLOGY & ADDITIONAL STUDIES:  < from: CT Neck Soft Tissue w/ IV Cont (21 @ 11:22) >  EXAM:  CT NECK SOFT TISSUE IC                            PROCEDURE DATE:  2021          INTERPRETATION:  CT EXAMINATION OF THE NECK    CLINICAL INDICATION: Right posterior neck abscess.    TECHNIQUE: Multidetector reformatted CT images of theneck were obtained following the intravenous administration of 90 cc of Omnipaque 350 IV contrast.    COMPARISON: None.    FINDINGS:  Skin thickening, subcutaneous fat stranding as well as muscular edema noted in the dorsal neck/lower occipital scalp. No well-defined rim-enhancing collection is identified. No air locules identified. There is no involvement of the osseous structures/spinal canal.    There is relative hypoattenuation involving thickened right dorsal para midline skin (3-24), which could represent phlegmonous change.    Aerodigestive structures: No evidence of mass or abnormal enhancement.    Lymph nodes: There is right greater than left cervical lymphadenopathy the, largest measuring 1.9 x 1.8 cm in the lower right level V (3-70).    Parotid and submandibular glands:  Normal.    Thyroid gland: Normal.    Vascular structures: Normal.    Osseous structures: No fracture, dislocation or destructive lesion.    Dentition: Caries involving the left maxillary and right mandibular molar teeth.    Paranasal sinuses: Clear.  Mastoid air cells:  Clear.    Partially visualized orbits: Normal    Partially visualized intracranial structures: Normal.    Partially visualized lung apices: Normal.    IMPRESSION:    Dorsal neck cellulitis and myositis. No evidence for abscess at this time. Hypoattenuation of the thickened overlying skin, which could represent phlegmonous change.    YELENA AREVALO M.D., ATTENDING RADIOLOGIST  This document has been electronically signed. Shaji [id number] 11:51AM    < end of copied text >

## 2021-06-16 NOTE — H&P ADULT - HISTORY OF PRESENT ILLNESS
Patient is a 41 year old male with PMHx of T2DM (not on medications) who is coming with complaints of posterior neck swelling, pain and discharge for the past 3 days. He did not have any recent trauma to the area, but does get shaves/haircuts in the area. The area surrounding the boil and discharge is hard, and the pain is worsened on movement of the neck. He endorses taking tylenol and placing 'hot rags' on the area to no relief. He endorses that he has previously had multiple 'bumps' that occurred on the back of his head which often times drain yellow or red fluid and self resolve. This has occurred numerous times to the extent that hair no longer grows in the posterior aspect of his head. He has never taken antibiotics for these complaints. This was not associated with fever, chills, rigors, headache, visual disturbances, nausea, vomiting, dysphagia, chest pain, or shortness of breath. Of Note: Patient was diagnosed with diabetes 4 years ago and started on metformin initially but discontinued.

## 2021-06-16 NOTE — H&P ADULT - NSICDXPASTMEDICALHX_GEN_ALL_CORE_FT
PAST MEDICAL HISTORY:  Diabetes Diagnosed 4 years ago. Previously on Metformin, discontinued and on "diet control".

## 2021-06-17 LAB
24R-OH-CALCIDIOL SERPL-MCNC: 10.3 NG/ML — LOW (ref 30–80)
A1C WITH ESTIMATED AVERAGE GLUCOSE RESULT: 12.2 % — HIGH (ref 4–5.6)
ANION GAP SERPL CALC-SCNC: 8 MMOL/L — SIGNIFICANT CHANGE UP (ref 5–17)
BASOPHILS # BLD AUTO: 0.06 K/UL — SIGNIFICANT CHANGE UP (ref 0–0.2)
BASOPHILS NFR BLD AUTO: 0.5 % — SIGNIFICANT CHANGE UP (ref 0–2)
BUN SERPL-MCNC: 11 MG/DL — SIGNIFICANT CHANGE UP (ref 7–18)
CALCIUM SERPL-MCNC: 9 MG/DL — SIGNIFICANT CHANGE UP (ref 8.4–10.5)
CHLORIDE SERPL-SCNC: 101 MMOL/L — SIGNIFICANT CHANGE UP (ref 96–108)
CHOLEST SERPL-MCNC: 153 MG/DL — SIGNIFICANT CHANGE UP
CO2 SERPL-SCNC: 26 MMOL/L — SIGNIFICANT CHANGE UP (ref 22–31)
COVID-19 SPIKE DOMAIN AB INTERP: POSITIVE
COVID-19 SPIKE DOMAIN ANTIBODY RESULT: 105 U/ML — HIGH
CREAT SERPL-MCNC: 0.82 MG/DL — SIGNIFICANT CHANGE UP (ref 0.5–1.3)
CULTURE RESULTS: NO GROWTH — SIGNIFICANT CHANGE UP
EOSINOPHIL # BLD AUTO: 0.2 K/UL — SIGNIFICANT CHANGE UP (ref 0–0.5)
EOSINOPHIL NFR BLD AUTO: 1.6 % — SIGNIFICANT CHANGE UP (ref 0–6)
ESTIMATED AVERAGE GLUCOSE: 303 MG/DL — HIGH (ref 68–114)
FOLATE SERPL-MCNC: 9.5 NG/ML — SIGNIFICANT CHANGE UP
GLUCOSE BLDC GLUCOMTR-MCNC: 145 MG/DL — HIGH (ref 70–99)
GLUCOSE BLDC GLUCOMTR-MCNC: 245 MG/DL — HIGH (ref 70–99)
GLUCOSE BLDC GLUCOMTR-MCNC: 249 MG/DL — HIGH (ref 70–99)
GLUCOSE BLDC GLUCOMTR-MCNC: 291 MG/DL — HIGH (ref 70–99)
GLUCOSE SERPL-MCNC: 261 MG/DL — HIGH (ref 70–99)
HCT VFR BLD CALC: 37.7 % — LOW (ref 39–50)
HDLC SERPL-MCNC: 43 MG/DL — SIGNIFICANT CHANGE UP
HGB BLD-MCNC: 13.1 G/DL — SIGNIFICANT CHANGE UP (ref 13–17)
HIV 1+2 AB+HIV1 P24 AG SERPL QL IA: SIGNIFICANT CHANGE UP
IMM GRANULOCYTES NFR BLD AUTO: 0.4 % — SIGNIFICANT CHANGE UP (ref 0–1.5)
LIPID PNL WITH DIRECT LDL SERPL: 86 MG/DL — SIGNIFICANT CHANGE UP
LYMPHOCYTES # BLD AUTO: 24.1 % — SIGNIFICANT CHANGE UP (ref 13–44)
LYMPHOCYTES # BLD AUTO: 3.03 K/UL — SIGNIFICANT CHANGE UP (ref 1–3.3)
MAGNESIUM SERPL-MCNC: 2.1 MG/DL — SIGNIFICANT CHANGE UP (ref 1.6–2.6)
MCHC RBC-ENTMCNC: 29.1 PG — SIGNIFICANT CHANGE UP (ref 27–34)
MCHC RBC-ENTMCNC: 34.7 GM/DL — SIGNIFICANT CHANGE UP (ref 32–36)
MCV RBC AUTO: 83.8 FL — SIGNIFICANT CHANGE UP (ref 80–100)
MONOCYTES # BLD AUTO: 0.89 K/UL — SIGNIFICANT CHANGE UP (ref 0–0.9)
MONOCYTES NFR BLD AUTO: 7.1 % — SIGNIFICANT CHANGE UP (ref 2–14)
NEUTROPHILS # BLD AUTO: 8.36 K/UL — HIGH (ref 1.8–7.4)
NEUTROPHILS NFR BLD AUTO: 66.3 % — SIGNIFICANT CHANGE UP (ref 43–77)
NON HDL CHOLESTEROL: 110 MG/DL — SIGNIFICANT CHANGE UP
NRBC # BLD: 0 /100 WBCS — SIGNIFICANT CHANGE UP (ref 0–0)
PHOSPHATE SERPL-MCNC: 3.7 MG/DL — SIGNIFICANT CHANGE UP (ref 2.5–4.5)
PLATELET # BLD AUTO: 285 K/UL — SIGNIFICANT CHANGE UP (ref 150–400)
POTASSIUM SERPL-MCNC: 3.7 MMOL/L — SIGNIFICANT CHANGE UP (ref 3.5–5.3)
POTASSIUM SERPL-SCNC: 3.7 MMOL/L — SIGNIFICANT CHANGE UP (ref 3.5–5.3)
RBC # BLD: 4.5 M/UL — SIGNIFICANT CHANGE UP (ref 4.2–5.8)
RBC # FLD: 11.1 % — SIGNIFICANT CHANGE UP (ref 10.3–14.5)
SARS-COV-2 IGG+IGM SERPL QL IA: 105 U/ML — HIGH
SARS-COV-2 IGG+IGM SERPL QL IA: POSITIVE
SODIUM SERPL-SCNC: 135 MMOL/L — SIGNIFICANT CHANGE UP (ref 135–145)
SPECIMEN SOURCE: SIGNIFICANT CHANGE UP
TRIGL SERPL-MCNC: 118 MG/DL — SIGNIFICANT CHANGE UP
TSH SERPL-MCNC: 0.42 UU/ML — SIGNIFICANT CHANGE UP (ref 0.34–4.82)
VIT B12 SERPL-MCNC: 342 PG/ML — SIGNIFICANT CHANGE UP (ref 232–1245)
WBC # BLD: 12.59 K/UL — HIGH (ref 3.8–10.5)
WBC # FLD AUTO: 12.59 K/UL — HIGH (ref 3.8–10.5)

## 2021-06-17 PROCEDURE — 99233 SBSQ HOSP IP/OBS HIGH 50: CPT | Mod: GC

## 2021-06-17 RX ORDER — KETOROLAC TROMETHAMINE 30 MG/ML
15 SYRINGE (ML) INJECTION EVERY 6 HOURS
Refills: 0 | Status: DISCONTINUED | OUTPATIENT
Start: 2021-06-17 | End: 2021-06-18

## 2021-06-17 RX ORDER — INSULIN GLARGINE 100 [IU]/ML
20 INJECTION, SOLUTION SUBCUTANEOUS AT BEDTIME
Refills: 0 | Status: DISCONTINUED | OUTPATIENT
Start: 2021-06-17 | End: 2021-06-18

## 2021-06-17 RX ORDER — KETOROLAC TROMETHAMINE 30 MG/ML
15 SYRINGE (ML) INJECTION ONCE
Refills: 0 | Status: DISCONTINUED | OUTPATIENT
Start: 2021-06-17 | End: 2021-06-17

## 2021-06-17 RX ORDER — ERGOCALCIFEROL 1.25 MG/1
50000 CAPSULE ORAL
Refills: 0 | Status: DISCONTINUED | OUTPATIENT
Start: 2021-06-17 | End: 2021-06-18

## 2021-06-17 RX ORDER — ACETAMINOPHEN 500 MG
650 TABLET ORAL EVERY 4 HOURS
Refills: 0 | Status: DISCONTINUED | OUTPATIENT
Start: 2021-06-17 | End: 2021-06-18

## 2021-06-17 RX ADMIN — Medication 2: at 08:00

## 2021-06-17 RX ADMIN — Medication 650 MILLIGRAM(S): at 12:45

## 2021-06-17 RX ADMIN — ERGOCALCIFEROL 50000 UNIT(S): 1.25 CAPSULE ORAL at 17:41

## 2021-06-17 RX ADMIN — Medication 650 MILLIGRAM(S): at 12:14

## 2021-06-17 RX ADMIN — Medication 15 MILLIGRAM(S): at 03:10

## 2021-06-17 RX ADMIN — Medication 650 MILLIGRAM(S): at 23:25

## 2021-06-17 RX ADMIN — Medication 4 UNIT(S): at 17:41

## 2021-06-17 RX ADMIN — Medication 4 UNIT(S): at 12:11

## 2021-06-17 RX ADMIN — Medication 15 MILLIGRAM(S): at 02:53

## 2021-06-17 RX ADMIN — Medication 4 UNIT(S): at 08:00

## 2021-06-17 RX ADMIN — Medication 15 MILLIGRAM(S): at 12:48

## 2021-06-17 RX ADMIN — Medication 650 MILLIGRAM(S): at 22:30

## 2021-06-17 RX ADMIN — Medication 1: at 22:29

## 2021-06-17 RX ADMIN — Medication 2: at 12:11

## 2021-06-17 RX ADMIN — AMPICILLIN SODIUM AND SULBACTAM SODIUM 200 GRAM(S): 250; 125 INJECTION, POWDER, FOR SUSPENSION INTRAMUSCULAR; INTRAVENOUS at 12:48

## 2021-06-17 RX ADMIN — AMPICILLIN SODIUM AND SULBACTAM SODIUM 200 GRAM(S): 250; 125 INJECTION, POWDER, FOR SUSPENSION INTRAMUSCULAR; INTRAVENOUS at 00:26

## 2021-06-17 RX ADMIN — INSULIN GLARGINE 20 UNIT(S): 100 INJECTION, SOLUTION SUBCUTANEOUS at 22:29

## 2021-06-17 RX ADMIN — Medication 15 MILLIGRAM(S): at 13:20

## 2021-06-17 RX ADMIN — AMPICILLIN SODIUM AND SULBACTAM SODIUM 200 GRAM(S): 250; 125 INJECTION, POWDER, FOR SUSPENSION INTRAMUSCULAR; INTRAVENOUS at 18:10

## 2021-06-17 RX ADMIN — AMPICILLIN SODIUM AND SULBACTAM SODIUM 200 GRAM(S): 250; 125 INJECTION, POWDER, FOR SUSPENSION INTRAMUSCULAR; INTRAVENOUS at 05:36

## 2021-06-17 NOTE — PROGRESS NOTE ADULT - ATTENDING COMMENTS
Patient seen and examined this afternoon    41 year old male with PMHx of T2DM (not on medications) who is coming with complaints of posterior neck swelling, pain and discharge for the past 3 days, He has Hx DM x 5 yrs was on meds Metformin but according to patient PCP discontinued after he was doing Diet and Exercise. He has not seen a Physician in long time. Has these type of eruptions but never needed antibiotics. Works in construction.     Vital Signs Last 24 Hrs  T(C): 37.4 (17 Jun 2021 13:59), Max: 37.6 (16 Jun 2021 19:09)  T(F): 99.4 (17 Jun 2021 13:59), Max: 99.7 (16 Jun 2021 19:09)  HR: 66 (17 Jun 2021 13:59) (59 - 77)  BP: 120/75 (17 Jun 2021 13:59) (117/71 - 140/79)  RR: 18 (17 Jun 2021 13:59) (16 - 18)  SpO2: 96% (17 Jun 2021 13:59) (96% - 100%)    P/E:  Middle aged male, comfortable at rest, NAD  HEENT: Posterior head/ neck hard indurated area with opening draining pus  CVS: S1S2 present, regular  Resp: BLAE+, No wheeze or Rhonchi  GI: Soft, BS+, NT  Extr: No edema or calf tenderness    Labs:                        13.1   12.59 )-----------( 285      ( 17 Jun 2021 06:10 )             37.7   06-17    135  |  101  |  11  ----------------------------<  261<H>  3.7   |  26  |  0.82    Ca    9.0      17 Jun 2021 06:10  Phos  3.7     06-17  Mg     2.1     06-17    TPro  7.5  /  Alb  3.2<L>  /  TBili  0.6  /  DBili  x   /  AST  26  /  ALT  47  /  AlkPhos  105  06-16    A1C with Estimated Average Glucose (06.17.21 @ 10:22) A1C with Estimated Average Glucose Result: 12.2:  Vitamin D, 25-Hydroxy (06.17.21 @ 10:47) Vitamin D, 25-Hydroxy: 10.3:   Vitamin B12, Serum (06.17.21 @ 10:47) Vitamin B12, Serum: 342 pg/mL   Folate, Serum (06.17.21 @ 10:47) Folate, Serum: 9.5 ng/mL   Thyroid Stimulating Hormone, Serum (06.17.21 @ 06:10) Thyroid Stimulating Hormone, Serum: 0.42 uU/mL     CT Neck Soft Tissue w/ IV Cont (06.16.21 @ 11:22)     IMPRESSION: Dorsal neck cellulitis and myositis. No evidence for abscess at this time. Hypoattenuation of the thickened overlying skin, which could represent phlegmonous change.    D/D:  Infected Carbuncle with superimposed Cellulitis and Myositis  Uncontrolled Type 2 DM with Hyperglycemia  Non compliance with medical Rx    Plan:  Discussed with ID Dr. Roberts; Continue Unasyn  May get I&D if Sx can  Discussed with Sx Dr. Johnston; Will keep NPO after Midnight for OR tomorrow if needed if clinical evidence of collection    Endo consult appreciated    Based on sugar control next 48 hrs will consider oral hypoglycemics on discharge if Endo agrees  DVT ppx  Discussed with JEREMIAH Morrow and ID and Sx Patient seen and examined this afternoon    41 year old male with PMHx of T2DM (not on medications) who is coming with complaints of posterior neck swelling, pain and discharge for the past 3 days, He has Hx DM x 5 yrs was on meds Metformin but according to patient PCP discontinued after he was doing Diet and Exercise. He has not seen a Physician in long time. Has these type of eruptions but never needed antibiotics. Works in construction.     Patient admitted with recurrent Carbuncle infected with pus drainage    Patient is doing okay; seen by ID on IV ABX; seen by Sx recommended conservative Rx and monitoring    Vital Signs Last 24 Hrs  T(C): 37.4 (17 Jun 2021 13:59), Max: 37.6 (16 Jun 2021 19:09)  T(F): 99.4 (17 Jun 2021 13:59), Max: 99.7 (16 Jun 2021 19:09)  HR: 66 (17 Jun 2021 13:59) (59 - 77)  BP: 120/75 (17 Jun 2021 13:59) (117/71 - 140/79)  RR: 18 (17 Jun 2021 13:59) (16 - 18)  SpO2: 96% (17 Jun 2021 13:59) (96% - 100%)    P/E:  Middle aged male, comfortable at rest, NAD  HEENT: Posterior head/ neck hard indurated area with opening draining pus  CVS: S1S2 present, regular  Resp: BLAE+, No wheeze or Rhonchi  GI: Soft, BS+, NT  Extr: No edema or calf tenderness    Labs:                        13.1   12.59 )-----------( 285      ( 17 Jun 2021 06:10 )             37.7   06-17    135  |  101  |  11  ----------------------------<  261<H>  3.7   |  26  |  0.82    Ca    9.0      17 Jun 2021 06:10  Phos  3.7     06-17  Mg     2.1     06-17    TPro  7.5  /  Alb  3.2<L>  /  TBili  0.6  /  DBili  x   /  AST  26  /  ALT  47  /  AlkPhos  105  06-16    A1C with Estimated Average Glucose (06.17.21 @ 10:22) A1C with Estimated Average Glucose Result: 12.2:  Vitamin D, 25-Hydroxy (06.17.21 @ 10:47) Vitamin D, 25-Hydroxy: 10.3:   Vitamin B12, Serum (06.17.21 @ 10:47) Vitamin B12, Serum: 342 pg/mL   Folate, Serum (06.17.21 @ 10:47) Folate, Serum: 9.5 ng/mL   Thyroid Stimulating Hormone, Serum (06.17.21 @ 06:10) Thyroid Stimulating Hormone, Serum: 0.42 uU/mL     CT Neck Soft Tissue w/ IV Cont (06.16.21 @ 11:22)     IMPRESSION: Dorsal neck cellulitis and myositis. No evidence for abscess at this time. Hypoattenuation of the thickened overlying skin, which could represent phlegmonous change.    D/D:  Infected Carbuncle with superimposed Cellulitis and Myositis  Uncontrolled Type 2 DM with Hyperglycemia due to   Non compliance with medical Rx    Plan:  Discussed with ID Dr. Roberts; Continue Unasyn  May get I&D if Sx can drain   Discussed with Sx Dr. Johnston; Will keep NPO after Midnight for OR tomorrow if needed for I&D if clinical evidence of collection  Endo consult appreciated; Basal and Bolus Insulin at least temporarily  Nutritional consult and Diabetic teaching for Insulin administration  Once sugars better controlled will consider oral hypoglycemics on discharge if Endo agrees  Will start Metformin 500 mg BID tomorrow  DVT ppx    Discussed with Patient findings and plan of care  Discussed with PGY1 Dr. Worrell and RN

## 2021-06-17 NOTE — PROGRESS NOTE ADULT - ASSESSMENT
Patient is a 41 year old male with PMHx of T2DM (not on medications) who is coming with complaints of posterior neck swelling, pain and discharge for the past 3 days, admitted for cellulitis     In the ED,  VS noted to be: 99.3F, HR 74bpm, BP: 142/95 mmHg, RR 18, Spo2 99% on RA   Labs significant for BS: 259, lactate 0.9, WBC 12.84   CT Neck done showing dorsal neck cellulitis, and myositis; no abscess at this time

## 2021-06-17 NOTE — PROGRESS NOTE ADULT - PROBLEM SELECTOR PLAN 1
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 Roberts   - Surgery Dr. Solis consulted - Patient does not meet SIRS criteria on admission, WBC 12.84, lactate 0.9  - Exam shows pustule with satellite lesions, and induration  - Likely 2/2 to community acquired MRSA infection, especially due ton recurrent episodes   - started on Unasyn  - F/u HIV panel   - ID Dr. Roberts   - Surgery Dr. Solis consulted

## 2021-06-17 NOTE — PROGRESS NOTE ADULT - SUBJECTIVE AND OBJECTIVE BOX
41y Male    Meds:  ampicillin/sulbactam  IVPB 3 Gram(s) IV Intermittent every 6 hours    Allergies    No Known Allergies    Intolerances        VITALS:  Vital Signs Last 24 Hrs  T(C): 37.4 (17 Jun 2021 13:59), Max: 37.6 (16 Jun 2021 19:09)  T(F): 99.4 (17 Jun 2021 13:59), Max: 99.7 (16 Jun 2021 19:09)  HR: 66 (17 Jun 2021 13:59) (59 - 77)  BP: 120/75 (17 Jun 2021 13:59) (117/71 - 140/79)  BP(mean): --  RR: 18 (17 Jun 2021 13:59) (16 - 18)  SpO2: 96% (17 Jun 2021 13:59) (96% - 100%)    LABS/DIAGNOSTIC TESTS:                          13.1   12.59 )-----------( 285      ( 17 Jun 2021 06:10 )             37.7         06-17    135  |  101  |  11  ----------------------------<  261<H>  3.7   |  26  |  0.82    Ca    9.0      17 Jun 2021 06:10  Phos  3.7     06-17  Mg     2.1     06-17    TPro  7.5  /  Alb  3.2<L>  /  TBili  0.6  /  DBili  x   /  AST  26  /  ALT  47  /  AlkPhos  105  06-16      LIVER FUNCTIONS - ( 16 Jun 2021 10:36 )  Alb: 3.2 g/dL / Pro: 7.5 g/dL / ALK PHOS: 105 U/L / ALT: 47 U/L DA / AST: 26 U/L / GGT: x             CULTURES: .Blood Blood-Peripheral  06-16 @ 13:24   No growth to date.  --  --            RADIOLOGY:      ROS:  [  ] UNABLE TO ELICIT 41y Male who is feeling a little better today with slightly less swelling of posterior neck region and less pain and drainage as well, he has he has no fevers, chills or diarrhea. He was seen by surgery and is not going to benefit from an I&D at this point in time, if he develops a fluctuant abscess over the next day or two then will go to the OR for drainage. He has no new complaints.     Meds:  ampicillin/sulbactam  IVPB 3 Gram(s) IV Intermittent every 6 hours    Allergies    No Known Allergies    Intolerances        VITALS:  Vital Signs Last 24 Hrs  T(C): 37.4 (17 Jun 2021 13:59), Max: 37.6 (16 Jun 2021 19:09)  T(F): 99.4 (17 Jun 2021 13:59), Max: 99.7 (16 Jun 2021 19:09)  HR: 66 (17 Jun 2021 13:59) (59 - 77)  BP: 120/75 (17 Jun 2021 13:59) (117/71 - 140/79)  BP(mean): --  RR: 18 (17 Jun 2021 13:59) (16 - 18)  SpO2: 96% (17 Jun 2021 13:59) (96% - 100%)    LABS/DIAGNOSTIC TESTS:                          13.1   12.59 )-----------( 285      ( 17 Jun 2021 06:10 )             37.7         06-17    135  |  101  |  11  ----------------------------<  261<H>  3.7   |  26  |  0.82    Ca    9.0      17 Jun 2021 06:10  Phos  3.7     06-17  Mg     2.1     06-17    TPro  7.5  /  Alb  3.2<L>  /  TBili  0.6  /  DBili  x   /  AST  26  /  ALT  47  /  AlkPhos  105  06-16      LIVER FUNCTIONS - ( 16 Jun 2021 10:36 )  Alb: 3.2 g/dL / Pro: 7.5 g/dL / ALK PHOS: 105 U/L / ALT: 47 U/L DA / AST: 26 U/L / GGT: x             CULTURES: .Blood Blood-Peripheral  06-16 @ 13:24   No growth to date.  --  --            RADIOLOGY:      ROS:  [  ] UNABLE TO ELICIT

## 2021-06-17 NOTE — PROGRESS NOTE ADULT - ASSESSMENT
Carbuncle of neck  Leukocytosis - mild.    Plan -   Cont  Unasyn 3gms iv q6 hrs  if lesion matures into a fluctuant abscess then possible I&D tomorrow.

## 2021-06-17 NOTE — CONSULT NOTE ADULT - PROBLEM SELECTOR RECOMMENDATION 9
due to noncompliance and cellulitis  change lantus to 20 units   admelog 4 ac tid  add metformin 500 bid if no contrast studies planned  dm teaching   nutrition eval  fsg ac and hs   d/w hs

## 2021-06-17 NOTE — PROGRESS NOTE ADULT - PROBLEM SELECTOR PLAN 2
- Pt has history of uncontrolled DM not on any medications   - F/u A1c  - Start lower than weight based insulin regimen as insulin naive ; lantus 15U HS, and 4 units Premeal admelog   - Accuchecks ACHS   - CC diet  - Endo Dr. Ahmadi

## 2021-06-17 NOTE — PROGRESS NOTE ADULT - SUBJECTIVE AND OBJECTIVE BOX
INTERVAL HPI/OVERNIGHT EVENTS:  Pt stable.   Tolerating diet.   Neck pain improved.    Vital Signs Last 24 Hrs  T(C): 37.1 (17 Jun 2021 04:57), Max: 37.7 (16 Jun 2021 15:29)  T(F): 98.8 (17 Jun 2021 04:57), Max: 99.9 (16 Jun 2021 15:29)  HR: 59 (17 Jun 2021 04:57) (59 - 83)  BP: 117/71 (17 Jun 2021 04:57) (117/71 - 164/64)  BP(mean): --  RR: 16 (17 Jun 2021 04:57) (16 - 17)  SpO2: 97% (17 Jun 2021 04:57) (97% - 100%)    Physical:  Posterior neck same, indurated, mildly tender, multiple sinuses draining seropurulent fluid.  No fluctuant area.  Abdomen: Soft nondistended, nontender.    I&O's Summary      LABS:                        13.1   12.59 )-----------( 285      ( 17 Jun 2021 06:10 )             37.7             06-17    135  |  101  |  11  ----------------------------<  261<H>  3.7   |  26  |  0.82    Ca    9.0      17 Jun 2021 06:10  Phos  3.7     06-17  Mg     2.1     06-17    TPro  7.5  /  Alb  3.2<L>  /  TBili  0.6  /  DBili  x   /  AST  26  /  ALT  47  /  AlkPhos  105  06-16

## 2021-06-17 NOTE — PROGRESS NOTE ADULT - SUBJECTIVE AND OBJECTIVE BOX
PGY-1 Progress Note discussed with attending    PAGER #: [[phone]] TILL 5:00 PM  PLEASE CONTACT ON CALL TEAM:  - On Call Team (Please refer to Prieto) FROM 5:00 PM - 8:30PM  - Nightfloat Team FROM 8:30 -7:30 AM    INTERVAL HPI/OVERNIGHT EVENTS:   No febrile episodes. Pt     REVIEW OF SYSTEMS:  CONSTITUTIONAL: No fever, weight loss, or fatigue  RESPIRATORY: No cough, wheezing, chills or hemoptysis; No shortness of breath  CARDIOVASCULAR: No chest pain, palpitations, dizziness, or leg swelling  GASTROINTESTINAL: No abdominal pain. No nausea, vomiting, or hematemesis; No diarrhea or constipation. No melena or hematochezia.  GENITOURINARY: No dysuria or hematuria, urinary frequency  NEUROLOGICAL: No headaches, memory loss, loss of strength, numbness, or tremors  SKIN: No itching, burning, rashes, or lesions     Vital Signs Last 24 Hrs  T(C): 37.1 (17 Jun 2021 04:57), Max: 37.7 (16 Jun 2021 15:29)  T(F): 98.8 (17 Jun 2021 04:57), Max: 99.9 (16 Jun 2021 15:29)  HR: 59 (17 Jun 2021 04:57) (59 - 83)  BP: 117/71 (17 Jun 2021 04:57) (117/71 - 164/64)  BP(mean): --  RR: 16 (17 Jun 2021 04:57) (16 - 18)  SpO2: 97% (17 Jun 2021 04:57) (97% - 100%)    PHYSICAL EXAMINATION:  GENERAL: NAD, well built  HEAD:  Atraumatic, Normocephalic  EYES:  conjunctiva and sclera clear  NECK: Supple, No JVD, Normal thyroid  CHEST/LUNG: Clear to auscultation. Clear to percussion bilaterally; No rales, rhonchi, wheezing, or rubs  HEART: Regular rate and rhythm; No murmurs, rubs, or gallops  ABDOMEN: Soft, Nontender, Nondistended; Bowel sounds present  NERVOUS SYSTEM:  Alert & Oriented X3,    EXTREMITIES:  2+ Peripheral Pulses, No clubbing, cyanosis, or edema  SKIN: warm dry                          13.1   12.59 )-----------( 285      ( 17 Jun 2021 06:10 )             37.7     06-17    135  |  101  |  11  ----------------------------<  261<H>  3.7   |  26  |  0.82    Ca    9.0      17 Jun 2021 06:10  Phos  3.7     06-17  Mg     2.1     06-17    TPro  7.5  /  Alb  3.2<L>  /  TBili  0.6  /  DBili  x   /  AST  26  /  ALT  47  /  AlkPhos  105  06-16    LIVER FUNCTIONS - ( 16 Jun 2021 10:36 )  Alb: 3.2 g/dL / Pro: 7.5 g/dL / ALK PHOS: 105 U/L / ALT: 47 U/L DA / AST: 26 U/L / GGT: x               PT/INR - ( 16 Jun 2021 10:36 )   PT: 12.5 sec;   INR: 1.05 ratio         PTT - ( 16 Jun 2021 10:36 )  PTT:30.8 sec    CAPILLARY BLOOD GLUCOSE      RADIOLOGY & ADDITIONAL TESTS:                   PGY-1 Progress Note discussed with attending    PAGER #: [[phone]] TILL 5:00 PM  PLEASE CONTACT ON CALL TEAM:  - On Call Team (Please refer to Prieto) FROM 5:00 PM - 8:30PM  - Nightfloat Team FROM 8:30 -7:30 AM    INTERVAL HPI/OVERNIGHT EVENTS:   No febrile episodes. Pt without new complaints. Pt notes persistence of mild to moderate pain at the back of his neck    REVIEW OF SYSTEMS:  CONSTITUTIONAL: No fever, weight loss, or fatigue  RESPIRATORY: No cough, wheezing, chills or hemoptysis; No shortness of breath  CARDIOVASCULAR: No chest pain, palpitations, dizziness, or leg swelling  GASTROINTESTINAL: No abdominal pain. No nausea, vomiting, or hematemesis; No diarrhea or constipation. No melena or hematochezia.  GENITOURINARY: No dysuria or hematuria, urinary frequency  NEUROLOGICAL: No headaches, memory loss, loss of strength, numbness, or tremors  SKIN: No itching, burning, rashes, or lesions     Vital Signs Last 24 Hrs  T(C): 37.1 (17 Jun 2021 04:57), Max: 37.7 (16 Jun 2021 15:29)  T(F): 98.8 (17 Jun 2021 04:57), Max: 99.9 (16 Jun 2021 15:29)  HR: 59 (17 Jun 2021 04:57) (59 - 83)  BP: 117/71 (17 Jun 2021 04:57) (117/71 - 164/64)  BP(mean): --  RR: 16 (17 Jun 2021 04:57) (16 - 18)  SpO2: 97% (17 Jun 2021 04:57) (97% - 100%)    PHYSICAL EXAMINATION:  GENERAL: NAD, well built  HEAD:  Atraumatic, Normocephalic  EYES:  conjunctiva and sclera clear  NECK: Supple, No JVD, Normal thyroid  CHEST/LUNG: Clear to auscultation. Clear to percussion bilaterally; No rales, rhonchi, wheezing, or rubs  HEART: Regular rate and rhythm; No murmurs, rubs, or gallops  ABDOMEN: Soft, Nontender, Nondistended; Bowel sounds present  NERVOUS SYSTEM:  Alert & Oriented X3,    EXTREMITIES:  2+ Peripheral Pulses, No clubbing, cyanosis, or edema  SKIN: warm dry (+) Posterior aspect of the neck showed large pustule with satellite lesions and surrounding indurated skin with  no erythema. no active drainage noted                          13.1   12.59 )-----------( 285      ( 17 Jun 2021 06:10 )             37.7     06-17    135  |  101  |  11  ----------------------------<  261<H>  3.7   |  26  |  0.82    Ca    9.0      17 Jun 2021 06:10  Phos  3.7     06-17  Mg     2.1     06-17    TPro  7.5  /  Alb  3.2<L>  /  TBili  0.6  /  DBili  x   /  AST  26  /  ALT  47  /  AlkPhos  105  06-16    LIVER FUNCTIONS - ( 16 Jun 2021 10:36 )  Alb: 3.2 g/dL / Pro: 7.5 g/dL / ALK PHOS: 105 U/L / ALT: 47 U/L DA / AST: 26 U/L / GGT: x               PT/INR - ( 16 Jun 2021 10:36 )   PT: 12.5 sec;   INR: 1.05 ratio         PTT - ( 16 Jun 2021 10:36 )  PTT:30.8 sec    CAPILLARY BLOOD GLUCOSE      RADIOLOGY & ADDITIONAL TESTS:

## 2021-06-17 NOTE — CONSULT NOTE ADULT - SUBJECTIVE AND OBJECTIVE BOX
Patient is a 41y old  Male who presents with a chief complaint of Posterior neck swelling (2021 08:31)      HPI:  Patient is a 41 year old male with PMHx of T2DM (not on medications) who is coming with complaints of posterior neck swelling, pain and discharge for the past 3 days. He did not have any recent trauma to the area, but does get shaves/haircuts in the area. The area surrounding the boil and discharge is hard, and the pain is worsened on movement of the neck. He endorses taking tylenol and placing 'hot rags' on the area to no relief. He endorses that he has previously had multiple 'bumps' that occurred on the back of his head which often times drain yellow or red fluid and self resolve. This has occurred numerous times to the extent that hair no longer grows in the posterior aspect of his head. He has never taken antibiotics for these complaints. This was not associated with fever, chills, rigors, headache, visual disturbances, nausea, vomiting, dysphagia, chest pain, or shortness of breath. Of Note: Patient was diagnosed with diabetes 4 years ago and started on metformin initially but discontinued.  (2021 18:31)  Found to have uncont dm. Pt was told to d/c metformin as dm was well controlled about 4-5 yrs ago. No recent md visit    PAST MEDICAL & SURGICAL HISTORY:  Diabetes  Diagnosed 4 years ago. Previously on Metformin, discontinued and on &quot;diet control&quot;.    No significant past surgical history           MEDICATIONS  (STANDING):  ampicillin/sulbactam  IVPB 3 Gram(s) IV Intermittent every 6 hours  insulin glargine Injectable (LANTUS) 20 Unit(s) SubCutaneous at bedtime  insulin lispro (ADMELOG) corrective regimen sliding scale   SubCutaneous three times a day before meals  insulin lispro (ADMELOG) corrective regimen sliding scale   SubCutaneous at bedtime  insulin lispro Injectable (ADMELOG) 4 Unit(s) SubCutaneous three times a day before meals    MEDICATIONS  (PRN):  acetaminophen   Tablet .. 650 milliGRAM(s) Oral every 4 hours PRN Mild Pain (1 - 3)  ketorolac   Injectable 15 milliGRAM(s) IV Push every 6 hours PRN Moderate Pain (4 - 6)      FAMILY HISTORY:      SOCIAL HISTORY:      REVIEW OF SYSTEMS:  CONSTITUTIONAL: No fever, weight loss, or fatigue  EYES: No eye pain, visual disturbances, or discharge  ENT:  No difficulty hearing, tinnitus, vertigo; No sinus or throat pain  NECK: No pain or stiffness  RESPIRATORY: No cough, wheezing, chills or hemoptysis; No Shortness of Breath  CARDIOVASCULAR: No chest pain, palpitations, passing out, dizziness, or leg swelling  GASTROINTESTINAL: No abdominal or epigastric pain. No nausea, vomiting, or hematemesis; No diarrhea or constipation. No melena or hematochezia.  GENITOURINARY: No dysuria, frequency, hematuria, or incontinence  NEUROLOGICAL: No headaches, memory loss, loss of strength, numbness, or tremors  SKIN: No itching, burning, rashes, or lesions   LYMPH Nodes: No enlarged glands  ENDOCRINE: No heat or cold intolerance; No hair loss  MUSCULOSKELETAL: No joint pain or swelling; No muscle, back, or extremity pain  PSYCHIATRIC: No depression, anxiety, mood swings, or difficulty sleeping  HEME/LYMPH: No easy bruising, or bleeding gums  ALLERGY AND IMMUNOLOGIC: No hives or eczema	        Vital Signs Last 24 Hrs  T(C): 37.1 (2021 04:57), Max: 37.7 (2021 15:29)  T(F): 98.8 (2021 04:57), Max: 99.9 (2021 15:29)  HR: 59 (2021 04:57) (59 - 83)  BP: 117/71 (2021 04:57) (117/71 - 164/64)  BP(mean): --  RR: 16 (2021 04:57) (16 - 18)  SpO2: 97% (2021 04:57) (97% - 100%)      Constitutional:    HEENT: nad    Neck:  No JVD, bruits or thyromegaly    Respiratory:  Clear without rales or rhonchi    Cardiovascular:  RR without murmur, rub or gallop.    Gastrointestinal: Soft without hepatosplenomegaly.    Extremities: without cyanosis, clubbing or edema.    Neurological:  Oriented   x 3     . No gross sensory or motor defects.        LABS:                        13.1   12.59 )-----------( 285      ( 2021 06:10 )             37.7     06-17    135  |  101  |  11  ----------------------------<  261<H>  3.7   |  26  |  0.82    Ca    9.0      2021 06:10  Phos  3.7     06-  Mg     2.1     -17    TPro  7.5  /  Alb  3.2<L>  /  TBili  0.6  /  DBili  x   /  AST  26  /  ALT  47  /  AlkPhos  105  06-16        PT/INR - ( 2021 10:36 )   PT: 12.5 sec;   INR: 1.05 ratio         PTT - ( 2021 10:36 )  PTT:30.8 sec  Urinalysis Basic - ( 2021 13:19 )    Color: Yellow / Appearance: Clear / S.015 / pH: x  Gluc: x / Ketone: Moderate  / Bili: Negative / Urobili: Negative   Blood: x / Protein: 15 / Nitrite: Negative   Leuk Esterase: Negative / RBC: 0-2 /HPF / WBC 3-5 /HPF   Sq Epi: x / Non Sq Epi: Few /HPF / Bacteria: Trace /HPF      CAPILLARY BLOOD GLUCOSE      POCT Blood Glucose.: 249 mg/dL (2021 07:33)  POCT Blood Glucose.: 324 mg/dL (2021 21:04)      RADIOLOGY & ADDITIONAL STUDIES:

## 2021-06-17 NOTE — CONSULT NOTE ADULT - ASSESSMENT
Patient is a 41 year old male with PMHx of T2DM (not on medications) who is coming with complaints of posterior neck swelling, pain and discharge for the past 3 days. Found to have uncont dm. Pt was told to d/c metformin as dm was well controlled about 4-5 yrs ago. No recent md visit  
41 year old male with posterior head and neck cellulitis and myositis, chronic inflammation and skin changes. Small area with purulent discharge. Hyperglycemia.     - recommend IV antibiotics  - recommend ID consult  - patient should be seen by dermatology for investigation and treatment of recurrent lesions  - control of hyperglycemia/diabetes  - may continue warm compresses  - no surgical intervention at this time  - please reconsult if drainage or exam worsens  - discussed with Dr. Johnston 
Carbuncle of neck  Leukocytosis - mild.    Plan - Got 1 dose of vancomycin and 1 dose of Zosyn  will start Unasyn 3gms iv q6 hrs  Surgical eval to see if I&D required or not.

## 2021-06-18 ENCOUNTER — TRANSCRIPTION ENCOUNTER (OUTPATIENT)
Age: 41
End: 2021-06-18

## 2021-06-18 LAB
ALBUMIN SERPL ELPH-MCNC: 3.1 G/DL — LOW (ref 3.5–5)
ALP SERPL-CCNC: 99 U/L — SIGNIFICANT CHANGE UP (ref 40–120)
ALT FLD-CCNC: 47 U/L DA — SIGNIFICANT CHANGE UP (ref 10–60)
ANION GAP SERPL CALC-SCNC: 3 MMOL/L — LOW (ref 5–17)
ANION GAP SERPL CALC-SCNC: 9 MMOL/L — SIGNIFICANT CHANGE UP (ref 5–17)
AST SERPL-CCNC: 27 U/L — SIGNIFICANT CHANGE UP (ref 10–40)
BASOPHILS # BLD AUTO: 0.05 K/UL — SIGNIFICANT CHANGE UP (ref 0–0.2)
BASOPHILS # BLD AUTO: 0.07 K/UL — SIGNIFICANT CHANGE UP (ref 0–0.2)
BASOPHILS NFR BLD AUTO: 0.5 % — SIGNIFICANT CHANGE UP (ref 0–2)
BASOPHILS NFR BLD AUTO: 0.7 % — SIGNIFICANT CHANGE UP (ref 0–2)
BILIRUB SERPL-MCNC: 0.3 MG/DL — SIGNIFICANT CHANGE UP (ref 0.2–1.2)
BLD GP AB SCN SERPL QL: SIGNIFICANT CHANGE UP
BUN SERPL-MCNC: 14 MG/DL — SIGNIFICANT CHANGE UP (ref 7–18)
BUN SERPL-MCNC: 17 MG/DL — SIGNIFICANT CHANGE UP (ref 7–18)
CALCIUM SERPL-MCNC: 8.9 MG/DL — SIGNIFICANT CHANGE UP (ref 8.4–10.5)
CALCIUM SERPL-MCNC: 8.9 MG/DL — SIGNIFICANT CHANGE UP (ref 8.4–10.5)
CHLORIDE SERPL-SCNC: 104 MMOL/L — SIGNIFICANT CHANGE UP (ref 96–108)
CHLORIDE SERPL-SCNC: 106 MMOL/L — SIGNIFICANT CHANGE UP (ref 96–108)
CO2 SERPL-SCNC: 27 MMOL/L — SIGNIFICANT CHANGE UP (ref 22–31)
CO2 SERPL-SCNC: 30 MMOL/L — SIGNIFICANT CHANGE UP (ref 22–31)
CREAT SERPL-MCNC: 0.79 MG/DL — SIGNIFICANT CHANGE UP (ref 0.5–1.3)
CREAT SERPL-MCNC: 0.8 MG/DL — SIGNIFICANT CHANGE UP (ref 0.5–1.3)
EOSINOPHIL # BLD AUTO: 0.22 K/UL — SIGNIFICANT CHANGE UP (ref 0–0.5)
EOSINOPHIL # BLD AUTO: 0.26 K/UL — SIGNIFICANT CHANGE UP (ref 0–0.5)
EOSINOPHIL NFR BLD AUTO: 2.4 % — SIGNIFICANT CHANGE UP (ref 0–6)
EOSINOPHIL NFR BLD AUTO: 2.7 % — SIGNIFICANT CHANGE UP (ref 0–6)
GLUCOSE BLDC GLUCOMTR-MCNC: 128 MG/DL — HIGH (ref 70–99)
GLUCOSE BLDC GLUCOMTR-MCNC: 132 MG/DL — HIGH (ref 70–99)
GLUCOSE BLDC GLUCOMTR-MCNC: 173 MG/DL — HIGH (ref 70–99)
GLUCOSE BLDC GLUCOMTR-MCNC: 216 MG/DL — HIGH (ref 70–99)
GLUCOSE SERPL-MCNC: 131 MG/DL — HIGH (ref 70–99)
GLUCOSE SERPL-MCNC: 228 MG/DL — HIGH (ref 70–99)
HCT VFR BLD CALC: 37.3 % — LOW (ref 39–50)
HCT VFR BLD CALC: 38.3 % — LOW (ref 39–50)
HGB BLD-MCNC: 12.9 G/DL — LOW (ref 13–17)
HGB BLD-MCNC: 13 G/DL — SIGNIFICANT CHANGE UP (ref 13–17)
IMM GRANULOCYTES NFR BLD AUTO: 0.3 % — SIGNIFICANT CHANGE UP (ref 0–1.5)
IMM GRANULOCYTES NFR BLD AUTO: 0.5 % — SIGNIFICANT CHANGE UP (ref 0–1.5)
LYMPHOCYTES # BLD AUTO: 2.83 K/UL — SIGNIFICANT CHANGE UP (ref 1–3.3)
LYMPHOCYTES # BLD AUTO: 3.16 K/UL — SIGNIFICANT CHANGE UP (ref 1–3.3)
LYMPHOCYTES # BLD AUTO: 31.1 % — SIGNIFICANT CHANGE UP (ref 13–44)
LYMPHOCYTES # BLD AUTO: 32.5 % — SIGNIFICANT CHANGE UP (ref 13–44)
MCHC RBC-ENTMCNC: 28.9 PG — SIGNIFICANT CHANGE UP (ref 27–34)
MCHC RBC-ENTMCNC: 29.2 PG — SIGNIFICANT CHANGE UP (ref 27–34)
MCHC RBC-ENTMCNC: 33.9 GM/DL — SIGNIFICANT CHANGE UP (ref 32–36)
MCHC RBC-ENTMCNC: 34.6 GM/DL — SIGNIFICANT CHANGE UP (ref 32–36)
MCV RBC AUTO: 84.4 FL — SIGNIFICANT CHANGE UP (ref 80–100)
MCV RBC AUTO: 85.1 FL — SIGNIFICANT CHANGE UP (ref 80–100)
MONOCYTES # BLD AUTO: 0.45 K/UL — SIGNIFICANT CHANGE UP (ref 0–0.9)
MONOCYTES # BLD AUTO: 0.63 K/UL — SIGNIFICANT CHANGE UP (ref 0–0.9)
MONOCYTES NFR BLD AUTO: 4.9 % — SIGNIFICANT CHANGE UP (ref 2–14)
MONOCYTES NFR BLD AUTO: 6.5 % — SIGNIFICANT CHANGE UP (ref 2–14)
NEUTROPHILS # BLD AUTO: 5.53 K/UL — SIGNIFICANT CHANGE UP (ref 1.8–7.4)
NEUTROPHILS # BLD AUTO: 5.54 K/UL — SIGNIFICANT CHANGE UP (ref 1.8–7.4)
NEUTROPHILS NFR BLD AUTO: 57.1 % — SIGNIFICANT CHANGE UP (ref 43–77)
NEUTROPHILS NFR BLD AUTO: 60.8 % — SIGNIFICANT CHANGE UP (ref 43–77)
NRBC # BLD: 0 /100 WBCS — SIGNIFICANT CHANGE UP (ref 0–0)
NRBC # BLD: 0 /100 WBCS — SIGNIFICANT CHANGE UP (ref 0–0)
PLATELET # BLD AUTO: 281 K/UL — SIGNIFICANT CHANGE UP (ref 150–400)
PLATELET # BLD AUTO: 308 K/UL — SIGNIFICANT CHANGE UP (ref 150–400)
POTASSIUM SERPL-MCNC: 3.7 MMOL/L — SIGNIFICANT CHANGE UP (ref 3.5–5.3)
POTASSIUM SERPL-MCNC: 3.7 MMOL/L — SIGNIFICANT CHANGE UP (ref 3.5–5.3)
POTASSIUM SERPL-SCNC: 3.7 MMOL/L — SIGNIFICANT CHANGE UP (ref 3.5–5.3)
POTASSIUM SERPL-SCNC: 3.7 MMOL/L — SIGNIFICANT CHANGE UP (ref 3.5–5.3)
PROT SERPL-MCNC: 7.3 G/DL — SIGNIFICANT CHANGE UP (ref 6–8.3)
RBC # BLD: 4.42 M/UL — SIGNIFICANT CHANGE UP (ref 4.2–5.8)
RBC # BLD: 4.5 M/UL — SIGNIFICANT CHANGE UP (ref 4.2–5.8)
RBC # FLD: 11.3 % — SIGNIFICANT CHANGE UP (ref 10.3–14.5)
RBC # FLD: 11.4 % — SIGNIFICANT CHANGE UP (ref 10.3–14.5)
SODIUM SERPL-SCNC: 139 MMOL/L — SIGNIFICANT CHANGE UP (ref 135–145)
SODIUM SERPL-SCNC: 140 MMOL/L — SIGNIFICANT CHANGE UP (ref 135–145)
WBC # BLD: 9.11 K/UL — SIGNIFICANT CHANGE UP (ref 3.8–10.5)
WBC # BLD: 9.71 K/UL — SIGNIFICANT CHANGE UP (ref 3.8–10.5)
WBC # FLD AUTO: 9.11 K/UL — SIGNIFICANT CHANGE UP (ref 3.8–10.5)
WBC # FLD AUTO: 9.71 K/UL — SIGNIFICANT CHANGE UP (ref 3.8–10.5)

## 2021-06-18 PROCEDURE — 99233 SBSQ HOSP IP/OBS HIGH 50: CPT | Mod: GC

## 2021-06-18 RX ORDER — ONDANSETRON 8 MG/1
4 TABLET, FILM COATED ORAL ONCE
Refills: 0 | Status: DISCONTINUED | OUTPATIENT
Start: 2021-06-18 | End: 2021-06-18

## 2021-06-18 RX ORDER — KETOROLAC TROMETHAMINE 30 MG/ML
15 SYRINGE (ML) INJECTION EVERY 6 HOURS
Refills: 0 | Status: DISCONTINUED | OUTPATIENT
Start: 2021-06-18 | End: 2021-06-18

## 2021-06-18 RX ORDER — AMPICILLIN SODIUM AND SULBACTAM SODIUM 250; 125 MG/ML; MG/ML
3 INJECTION, POWDER, FOR SUSPENSION INTRAMUSCULAR; INTRAVENOUS EVERY 6 HOURS
Refills: 0 | Status: DISCONTINUED | OUTPATIENT
Start: 2021-06-18 | End: 2021-06-18

## 2021-06-18 RX ORDER — HYDROMORPHONE HYDROCHLORIDE 2 MG/ML
0.5 INJECTION INTRAMUSCULAR; INTRAVENOUS; SUBCUTANEOUS
Refills: 0 | Status: DISCONTINUED | OUTPATIENT
Start: 2021-06-18 | End: 2021-06-18

## 2021-06-18 RX ORDER — ISOPROPYL ALCOHOL, BENZOCAINE .7; .06 ML/ML; ML/ML
1 SWAB TOPICAL
Qty: 120 | Refills: 1
Start: 2021-06-18 | End: 2021-08-16

## 2021-06-18 RX ORDER — INSULIN GLARGINE 100 [IU]/ML
20 INJECTION, SOLUTION SUBCUTANEOUS AT BEDTIME
Refills: 0 | Status: DISCONTINUED | OUTPATIENT
Start: 2021-06-18 | End: 2021-06-19

## 2021-06-18 RX ORDER — INSULIN LISPRO 100/ML
VIAL (ML) SUBCUTANEOUS AT BEDTIME
Refills: 0 | Status: DISCONTINUED | OUTPATIENT
Start: 2021-06-18 | End: 2021-06-19

## 2021-06-18 RX ORDER — INSULIN LISPRO 100/ML
VIAL (ML) SUBCUTANEOUS
Refills: 0 | Status: DISCONTINUED | OUTPATIENT
Start: 2021-06-18 | End: 2021-06-19

## 2021-06-18 RX ORDER — ERGOCALCIFEROL 1.25 MG/1
50000 CAPSULE ORAL
Refills: 0 | Status: DISCONTINUED | OUTPATIENT
Start: 2021-06-18 | End: 2021-06-21

## 2021-06-18 RX ORDER — AMPICILLIN SODIUM AND SULBACTAM SODIUM 250; 125 MG/ML; MG/ML
3 INJECTION, POWDER, FOR SUSPENSION INTRAMUSCULAR; INTRAVENOUS EVERY 6 HOURS
Refills: 0 | Status: DISCONTINUED | OUTPATIENT
Start: 2021-06-19 | End: 2021-06-20

## 2021-06-18 RX ORDER — ERGOCALCIFEROL 1.25 MG/1
1 CAPSULE ORAL
Qty: 4 | Refills: 0
Start: 2021-06-18 | End: 2021-07-17

## 2021-06-18 RX ORDER — INSULIN LISPRO 100/ML
4 VIAL (ML) SUBCUTANEOUS
Refills: 0 | Status: DISCONTINUED | OUTPATIENT
Start: 2021-06-18 | End: 2021-06-19

## 2021-06-18 RX ORDER — SODIUM CHLORIDE 9 MG/ML
1000 INJECTION, SOLUTION INTRAVENOUS
Refills: 0 | Status: DISCONTINUED | OUTPATIENT
Start: 2021-06-18 | End: 2021-06-18

## 2021-06-18 RX ORDER — ACETAMINOPHEN 500 MG
650 TABLET ORAL EVERY 4 HOURS
Refills: 0 | Status: DISCONTINUED | OUTPATIENT
Start: 2021-06-18 | End: 2021-06-21

## 2021-06-18 RX ADMIN — AMPICILLIN SODIUM AND SULBACTAM SODIUM 200 GRAM(S): 250; 125 INJECTION, POWDER, FOR SUSPENSION INTRAMUSCULAR; INTRAVENOUS at 11:45

## 2021-06-18 RX ADMIN — AMPICILLIN SODIUM AND SULBACTAM SODIUM 200 GRAM(S): 250; 125 INJECTION, POWDER, FOR SUSPENSION INTRAMUSCULAR; INTRAVENOUS at 06:12

## 2021-06-18 RX ADMIN — Medication 15 MILLIGRAM(S): at 12:02

## 2021-06-18 RX ADMIN — Medication 15 MILLIGRAM(S): at 23:45

## 2021-06-18 RX ADMIN — Medication 15 MILLIGRAM(S): at 12:30

## 2021-06-18 RX ADMIN — HYDROMORPHONE HYDROCHLORIDE 0.5 MILLIGRAM(S): 2 INJECTION INTRAMUSCULAR; INTRAVENOUS; SUBCUTANEOUS at 19:40

## 2021-06-18 RX ADMIN — Medication 1: at 11:46

## 2021-06-18 RX ADMIN — Medication 2: at 08:03

## 2021-06-18 RX ADMIN — Medication 15 MILLIGRAM(S): at 23:28

## 2021-06-18 RX ADMIN — AMPICILLIN SODIUM AND SULBACTAM SODIUM 200 GRAM(S): 250; 125 INJECTION, POWDER, FOR SUSPENSION INTRAMUSCULAR; INTRAVENOUS at 20:52

## 2021-06-18 RX ADMIN — AMPICILLIN SODIUM AND SULBACTAM SODIUM 200 GRAM(S): 250; 125 INJECTION, POWDER, FOR SUSPENSION INTRAMUSCULAR; INTRAVENOUS at 00:01

## 2021-06-18 RX ADMIN — HYDROMORPHONE HYDROCHLORIDE 0.5 MILLIGRAM(S): 2 INJECTION INTRAMUSCULAR; INTRAVENOUS; SUBCUTANEOUS at 19:25

## 2021-06-18 NOTE — DISCHARGE NOTE PROVIDER - PROVIDER TOKENS
PROVIDER:[TOKEN:[[id number]],FOLLOWUP:[1 week]],PROVIDER:[TOKEN:[[id number]],FOLLOWUP:[1 week]] PROVIDER:[TOKEN:[[id number]],FOLLOWUP:[1 week]],PROVIDER:[TOKEN:[[id number]],FOLLOWUP:[1 week]],PROVIDER:[TOKEN:[[id number]],FOLLOWUP:[2 weeks]]

## 2021-06-18 NOTE — PROGRESS NOTE ADULT - SUBJECTIVE AND OBJECTIVE BOX
Condition discussed with patient, options risks and benefits explained.  Questions answered.  Consent signed.  Incision and drainage posterior neck abscess today.

## 2021-06-18 NOTE — DISCHARGE NOTE PROVIDER - HOSPITAL COURSE
41 year old male with PMHx of T2DM (not on medications) who is coming with complaints of posterior neck swelling, pain and discharge for the past 3 days, admitted for posterior neck carbuncle.    Cellulitis of neck  Frequent episodes of boils in neck as per patient which resolve spontaneously. WBC 12.84. Pt started on unasyn, underwent I&D on 6/18 with Dr. Cerrato. Pt tolerated procedure well. Pt to be discharged on _____ x _ days. ID Dr. Roberts following.    DM 2  Pt has history of uncontrolled DM not on any medications. A1c 12.2 Pt is a new insulin DM. Pt provided teaching for DM with nutrition consult. Pt to be discharged on lantus and lispro. Dr. Ahmadi on board.    Patient is stable for discharge per attending and is advised to follow up with PCP as outpatient. Pt able to ambulate independently and was able to tolerate diet well.  Please refer to patient's complete medical chart with documents for a full hospital course, for this is only a brief summary.   41 year old male with PMHx of T2DM (not on medications) who is coming with complaints of posterior neck swelling, pain and discharge for the past 3 days, admitted for posterior neck carbuncle.    Cellulitis of neck  Frequent episodes of boils in neck as per patient which resolve spontaneously. WBC 12.84. Pt started on unasyn, underwent I&D on 6/18 with Dr. Cerrato. Pt tolerated procedure well. Pt to be discharged on _____ x _ days. ID Dr. Roberts following.    DM 2  Pt has history of uncontrolled DM not on any medications. A1c 12.2 Initially placed on lantus+lispro with improvement in sugars s/p I&D. Pt provided teaching for DM with nutrition consult. Pt to be discharged on Metformin. Dr. Ahmadi on board.    Patient is stable for discharge per attending and is advised to follow up with PCP as outpatient. Pt able to ambulate independently and was able to tolerate diet well.  Please refer to patient's complete medical chart with documents for a full hospital course, for this is only a brief summary.   41 year old male with PMHx of T2DM (not on medications) who is coming with complaints of posterior neck swelling, pain and discharge for the past 3 days, admitted for posterior neck carbuncle.    Cellulitis of neck  Frequent episodes of boils in neck as per patient which resolve spontaneously. WBC 12.84. Pt started on unasyn, underwent I&D on 6/18 with Dr. Cerrato. Pt tolerated procedure well. Pt to be discharged on Augmentin 975 x 10days + clindamcyin 1% cream daily 1 week before and 1 week after haircuts. ID Dr. Roberts following.    DM 2  Pt has history of uncontrolled DM not on any medications. A1c 12.2 Initially placed on lantus+lispro with improvement in sugars s/p I&D. Pt provided teaching for DM with nutrition consult. Pt to be discharged on Metformin. Dr. Ahmadi on board.    Patient is stable for discharge per attending and is advised to follow up with PCP as outpatient. Pt able to ambulate independently and was able to tolerate diet well.  Please refer to patient's complete medical chart with documents for a full hospital course, for this is only a brief summary.

## 2021-06-18 NOTE — PROGRESS NOTE ADULT - ASSESSMENT
Carbuncle of neck  Leukocytosis - improved    Plan - Cont  Unasyn 3gms iv q6 hrs  Will be going to the OR today for an I&D

## 2021-06-18 NOTE — PROGRESS NOTE ADULT - PROBLEM SELECTOR PLAN 1
- Patient does not meet SIRS criteria on admission, WBC 12.84, lactate 0.9  - Exam shows pustule with satellite lesions, and induration  - Likely 2/2 to community acquired MRSA infection, especially due ton recurrent episodes   - c/w Unasyn 6/16  - HIV neg  - ID Dr. Roberts   - Surgery Dr. Solis consulted

## 2021-06-18 NOTE — PROGRESS NOTE ADULT - PROBLEM SELECTOR PLAN 2
- Pt has history of uncontrolled DM not on any medications   - A1c 12.2  - lantus 20U HS, and 4 units Premeal admelog   - Acccarlo ACHS   - CC diet  - Endo Dr. Ahmadi

## 2021-06-18 NOTE — PROGRESS NOTE ADULT - SUBJECTIVE AND OBJECTIVE BOX
41y Male is under our care for     REVIEW OF SYSTEMS:  [  ] Not able to elicit  General:	  Chest:	  GI:	  :  Skin:	  Musculoskeletal:	  Neuro:	    MEDS:  ampicillin/sulbactam  IVPB 3 Gram(s) IV Intermittent every 6 hours    ALLERGIES: Allergies    No Known Allergies    Intolerances        VITALS:  Vital Signs Last 24 Hrs  T(C): 36.9 (18 Jun 2021 05:25), Max: 37.4 (17 Jun 2021 13:59)  T(F): 98.4 (18 Jun 2021 05:25), Max: 99.4 (17 Jun 2021 13:59)  HR: 56 (18 Jun 2021 05:25) (56 - 69)  BP: 103/56 (18 Jun 2021 05:25) (103/56 - 130/78)  BP(mean): --  RR: 18 (18 Jun 2021 05:25) (17 - 18)  SpO2: 99% (18 Jun 2021 05:25) (96% - 99%)      PHYSICAL EXAM:  HEENT:  Neck:  Respiratory:  Cardiovascular:  Gastrointestinal:  Extremities:  Skin:  Ortho:  Neuro:    LABS/DIAGNOSTIC TESTS:                        12.9   9.71  )-----------( 281      ( 18 Jun 2021 07:02 )             37.3     WBC Count: 9.71 K/uL (06-18 @ 07:02)  WBC Count: 12.59 K/uL (06-17 @ 06:10)  WBC Count: 12.84 K/uL (06-16 @ 10:36)    06-18    140  |  104  |  17  ----------------------------<  228<H>  3.7   |  27  |  0.79    Ca    8.9      18 Jun 2021 07:03  Phos  3.7     06-17  Mg     2.1     06-17        CULTURES:   .Urine Clean Catch (Midstream)  06-16 @ 18:21   No growth  --  --      .Blood Blood-Peripheral  06-16 @ 13:24   No growth to date.  --  --        RADIOLOGY:  no new studies 41y Male is under our care for Carbuncle of posterior neck.  Patient was seen laying comfortably in bed with no acute distress.  Carbuncle looks more mature so patient will be going today to the OR for an I&D.  He remains afebrile and WBC count is WNL.    REVIEW OF SYSTEMS:  [  ] Not able to elicit  General: no fevers no malaise  Chest: no cough no sob  GI: no nvd  : no urinary sxs   Skin: no rashes  Musculoskeletal: no trauma no LBP  Neuro: no ha's no dizziness     MEDS:  ampicillin/sulbactam  IVPB 3 Gram(s) IV Intermittent every 6 hours    ALLERGIES: Allergies    No Known Allergies    Intolerances        VITALS:  Vital Signs Last 24 Hrs  T(C): 36.9 (18 Jun 2021 05:25), Max: 37.4 (17 Jun 2021 13:59)  T(F): 98.4 (18 Jun 2021 05:25), Max: 99.4 (17 Jun 2021 13:59)  HR: 56 (18 Jun 2021 05:25) (56 - 69)  BP: 103/56 (18 Jun 2021 05:25) (103/56 - 130/78)  BP(mean): --  RR: 18 (18 Jun 2021 05:25) (17 - 18)  SpO2: 99% (18 Jun 2021 05:25) (96% - 99%)      PHYSICAL EXAM:  HEENT: n/a  Neck: supple no LN's   Respiratory: lungs clear no rales  Cardiovascular: S1 S2 reg no murmurs  Gastrointestinal: +BS with soft, nondistended abdomen; nontender  Extremities: no edema  Skin: Erythema and warmth over lesion on posterior neck with purulent drainage, tenderness +  Ortho: n/a  Neuro: AAO x 4      LABS/DIAGNOSTIC TESTS:                        12.9   9.71  )-----------( 281      ( 18 Jun 2021 07:02 )             37.3     WBC Count: 9.71 K/uL (06-18 @ 07:02)  WBC Count: 12.59 K/uL (06-17 @ 06:10)  WBC Count: 12.84 K/uL (06-16 @ 10:36)    06-18    140  |  104  |  17  ----------------------------<  228<H>  3.7   |  27  |  0.79    Ca    8.9      18 Jun 2021 07:03  Phos  3.7     06-17  Mg     2.1     06-17        CULTURES:   .Urine Clean Catch (Midstream)  06-16 @ 18:21   No growth  --  --      .Blood Blood-Peripheral  06-16 @ 13:24   No growth to date.  --  --        RADIOLOGY:  no new studies

## 2021-06-18 NOTE — PROGRESS NOTE ADULT - ASSESSMENT
41y.o. Male s/p I&D posterior neck abscess    -PO fluids  -Due to void by 2AM  -Pain control prn  -con't abx  -f/u C&S  -DVT ppx  -Incentive spirometry  -Packing change in AM

## 2021-06-18 NOTE — PROGRESS NOTE ADULT - SUBJECTIVE AND OBJECTIVE BOX
Surgery    Subjective:  Pt resting comfortably. No acute complaints.  Tolerating pain with meds.  Has yet to void post op.    T(C): 36.7 (06-18-21 @ 20:21), Max: 36.9 (06-18-21 @ 05:25)  HR: 65 (06-18-21 @ 20:21) (53 - 72)  BP: 117/77 (06-18-21 @ 20:21) (103/56 - 131/70)  RR: 16 (06-18-21 @ 20:21) (16 - 20)  SpO2: 97% (06-18-21 @ 20:21) (97% - 100%)    Physical:  Gen: A&O x3  HEENT: Posterior neck Dressing centrally blood tinged.

## 2021-06-18 NOTE — DISCHARGE NOTE PROVIDER - NSDCFUADDINST_GEN_ALL_CORE_FT
EXERCISE 30 MINS DAILY AT LEAST 5 DAYS A WEEK    CHECK SUGARS; DIABETIC DIET AS ADVISED    FOLLOW UP WITH PCP DR. GRIMES ([facility]) AS ADVISED    KEEP THE AREA CLEAN AFTER WASHING WITH SOAP AND WATER KEEP IT COVERED WITH A CLEAN GAUZE.

## 2021-06-18 NOTE — BRIEF OPERATIVE NOTE - NSICDXBRIEFPROCEDURE_GEN_ALL_CORE_FT
PROCEDURES:  Incision and drainage of posterior neck 18-Jun-2021 17:58:48 ABSCESS Katerina aVzquez

## 2021-06-18 NOTE — PROGRESS NOTE ADULT - SUBJECTIVE AND OBJECTIVE BOX
PGY-1 Progress Note discussed with attending    PAGER #: [[phone]] TILL 5:00 PM  PLEASE CONTACT ON CALL TEAM:  - On Call Team (Please refer to Prieto) FROM 5:00 PM - 8:30PM  - Nightfloat Team FROM 8:30 -7:30 AM    INTERVAL HPI/OVERNIGHT EVENTS:   No acute events overnight. Pt for I&D today at OR as per surgery Dr Lynn. Pt without other complaints, reports feeling okay overall.    REVIEW OF SYSTEMS:  CONSTITUTIONAL: No fever, weight loss, or fatigue  RESPIRATORY: No cough, wheezing, chills or hemoptysis; No shortness of breath  CARDIOVASCULAR: No chest pain, palpitations, dizziness, or leg swelling  GASTROINTESTINAL: No abdominal pain. No nausea, vomiting, or hematemesis; No diarrhea or constipation. No melena or hematochezia.  GENITOURINARY: No dysuria or hematuria, urinary frequency  NEUROLOGICAL: No headaches, memory loss, loss of strength, numbness, or tremors  SKIN: No itching, burning, rashes, or lesions     Vital Signs Last 24 Hrs  T(C): 36.9 (18 Jun 2021 05:25), Max: 37.4 (17 Jun 2021 13:59)  T(F): 98.4 (18 Jun 2021 05:25), Max: 99.4 (17 Jun 2021 13:59)  HR: 56 (18 Jun 2021 05:25) (56 - 69)  BP: 103/56 (18 Jun 2021 05:25) (103/56 - 130/78)  BP(mean): --  RR: 18 (18 Jun 2021 05:25) (17 - 18)  SpO2: 99% (18 Jun 2021 05:25) (96% - 99%)    PHYSICAL EXAMINATION:  GENERAL: NAD, well built  HEAD:  Atraumatic, Normocephalic  EYES:  conjunctiva and sclera clear  NECK: Supple, No JVD, Normal thyroid  CHEST/LUNG: Clear to auscultation. Clear to percussion bilaterally; No rales, rhonchi, wheezing, or rubs  HEART: Regular rate and rhythm; No murmurs, rubs, or gallops  ABDOMEN: Soft, Nontender, Nondistended; Bowel sounds present  NERVOUS SYSTEM:  Alert & Oriented X3,    EXTREMITIES:  2+ Peripheral Pulses, No clubbing, cyanosis, or edema  SKIN: warm dry (+) posterior occipital area with carbuncle, discharge purulent and bloody                          12.9   9.71  )-----------( 281      ( 18 Jun 2021 07:02 )             37.3     06-18    140  |  104  |  17  ----------------------------<  228<H>  3.7   |  27  |  0.79    Ca    8.9      18 Jun 2021 07:03  Phos  3.7     06-17  Mg     2.1     06-17                CAPILLARY BLOOD GLUCOSE      RADIOLOGY & ADDITIONAL TESTS:

## 2021-06-18 NOTE — PROGRESS NOTE ADULT - SUBJECTIVE AND OBJECTIVE BOX
Interval Events:  pt in nad    Allergies    No Known Allergies    Intolerances      Endocrine/Metabolic Medications:  insulin glargine Injectable (LANTUS) 20 Unit(s) SubCutaneous at bedtime  insulin lispro (ADMELOG) corrective regimen sliding scale   SubCutaneous three times a day before meals  insulin lispro (ADMELOG) corrective regimen sliding scale   SubCutaneous at bedtime  insulin lispro Injectable (ADMELOG) 4 Unit(s) SubCutaneous three times a day before meals      Vital Signs Last 24 Hrs  T(C): 36.9 (18 Jun 2021 05:25), Max: 37.4 (17 Jun 2021 13:59)  T(F): 98.4 (18 Jun 2021 05:25), Max: 99.4 (17 Jun 2021 13:59)  HR: 56 (18 Jun 2021 05:25) (56 - 69)  BP: 103/56 (18 Jun 2021 05:25) (103/56 - 130/78)  BP(mean): --  RR: 18 (18 Jun 2021 05:25) (17 - 18)  SpO2: 99% (18 Jun 2021 05:25) (96% - 99%)      PHYSICAL EXAM  All physical exam findings normal, except those marked:  General:	Alert, active, cooperative, NAD, well hydrated  .		[] Abnormal:  Neck		Normal: supple, no cervical adenopathy, no palpable thyroid  .		[] Abnormal:  Cardiovascular	Normal: regular rate, normal S1, S2, no murmurs  .		[] Abnormal:  Respiratory	Normal: no chest wall deformity, normal respiratory pattern, CTA B/L  .		[] Abnormal:  Abdominal	Normal: soft, ND, NT, bowel sounds present, no masses, no organomegaly  .		[] Abnormal:  		Normal normal genitalia, testes descended, circumcised/uncircumcised  .		Digna stage:			Breast digna:  .		Menstrual history:  .		[] Abnormal:  Extremities	Normal: FROM x4  .		[] Abnormal:  Skin		Normal: intact and not indurated, no rash, no acanthosis nigricans  .		[] Abnormal:  Neurologic	Normal: grossly intact  .		[] Abnormal:    LABS                        12.9   9.71  )-----------( 281      ( 18 Jun 2021 07:02 )             37.3                               140    |  104    |  17                  Calcium: 8.9   / iCa: x      (06-18 @ 07:03)    ----------------------------<  228       Magnesium: x                                3.7     |  27     |  0.79             Phosphorous: x          CAPILLARY BLOOD GLUCOSE      POCT Blood Glucose.: 173 mg/dL (18 Jun 2021 11:32)  POCT Blood Glucose.: 216 mg/dL (18 Jun 2021 07:52)  POCT Blood Glucose.: 291 mg/dL (17 Jun 2021 21:53)  POCT Blood Glucose.: 145 mg/dL (17 Jun 2021 17:10)        Assesment/plan    Patient is a 41 year old male with PMHx of T2DM (not on medications) who is coming with complaints of posterior neck swelling, pain and discharge for the past 3 days. Found to have uncont dm. Pt was told to d/c metformin as dm was well controlled about 4-5 yrs ago. No recent md visit       Problem/Recommendation - 1:  Problem: Uncontrolled type 2 diabetes mellitus with hyperglycemia. Recommendation: due to noncompliance and cellulitis  cont lantus to 20 units   admelog 4 ac tid  add metformin 500 bid after I and D  dm teaching   nutrition eval  fsg ac and hs   d/w hs.     Problem/Recommendation - 2:  ·  Problem: Cellulitis of neck.  Recommendation: cont iv abx   tx per surgery/ prim team.

## 2021-06-18 NOTE — DISCHARGE NOTE PROVIDER - NSDCMRMEDTOKEN_GEN_ALL_CORE_FT
Zofran ODT 4 mg oral tablet, disintegratin tab(s) orally every 8 hours, As Needed - for nausea   alcohol swabs : Apply topically to affected area 4 times a day   amoxicillin-clavulanate 875 mg-125 mg oral tablet: 1 tab(s) orally 2 times a day   Cleocin T 1% topical gel: Apply topically to affected area once a day 1 week before, and 1 week after hair cut  Drisdol 50,000 intl units (1.25 mg) oral capsule: 1 cap(s) orally once a week   glucometer (per patient&#x27;s insurance): Test blood sugars four times a day. Dispense #1 glucometer.  lancets: 1 application subcutaneously 4 times a day   metFORMIN 500 mg oral tablet: 1 tab(s) orally 2 times a day    alcohol swabs : Apply topically to affected area 4 times a day   amoxicillin-clavulanate 875 mg-125 mg oral tablet: 1 tab(s) orally 2 times a day   Cleocin T 1% topical lotion: Apply topically to affected area once a day   Drisdol 50,000 intl units (1.25 mg) oral capsule: 1 cap(s) orally once a week   glucometer (per patient&#x27;s insurance): Test blood sugars four times a day. Dispense #1 glucometer.  lancets: 1 application subcutaneously 4 times a day   metFORMIN 500 mg oral tablet: 1 tab(s) orally 2 times a day

## 2021-06-18 NOTE — DIETITIAN INITIAL EVALUATION ADULT. - OTHER INFO
Reports No weight loss. No complaints of Nausea, vomiting or diarrhea. No problems with chewing or swallowing food . allergy to mushrooms, communicated with kitchen. A1C: elevated , does not adhere to DM diet PTA, provided handout On carbohydrate counting for people with diabetes

## 2021-06-18 NOTE — DIETITIAN INITIAL EVALUATION ADULT. - PERTINENT LABORATORY DATA
06-18 Na140 mmol/L Glu 228 mg/dL<H> K+ 3.7 mmol/L Cr  0.79 mg/dL BUN 17 mg/dL 06-17 Phos 3.7 mg/dL 06-16 Alb 3.2 g/dL<L> 06-17 Chol 153 mg/dL LDL --    HDL 43 mg/dL Trig 118 mg/dL

## 2021-06-18 NOTE — PROGRESS NOTE ADULT - ATTENDING COMMENTS
Patient seen and examined this afternoon    41 year old male with PMHx of T2DM (not on medications) who is coming with complaints of posterior neck swelling, pain and discharge for the past 3 days, He has Hx DM x 5 yrs was on meds Metformin but according to patient PCP discontinued after he was doing Diet and Exercise. He has not seen a Physician in long time. Has these type of eruptions but never needed antibiotics. Works in construction.     Patient admitted with recurrent Carbuncle infected with pus drainage    Patient is doing okay; seen by ID on IV ABX; seen by Sx recommended conservative Rx and monitoring    Vital Signs Last 24 Hrs  T(C): 36.9 (18 Jun 2021 17:56), Max: 36.9 (18 Jun 2021 05:25)  T(F): 98.4 (18 Jun 2021 17:56), Max: 98.4 (18 Jun 2021 05:25)  HR: 63 (18 Jun 2021 18:56) (53 - 72)  BP: 125/72 (18 Jun 2021 18:56) (103/56 - 131/70)  BP(mean): 87 (18 Jun 2021 18:56) (84 - 88)  RR: 18 (18 Jun 2021 18:56) (17 - 20)  SpO2: 99% (18 Jun 2021 18:56) (97% - 99%)    P/E:  Middle aged male, comfortable at rest, NAD  HEENT: Posterior head/ neck hard indurated area with opening draining pus  CVS: S1S2 present, regular  Resp: BLAE+, No wheeze or Rhonchi  GI: Soft, BS+, NT  Extr: No edema or calf tenderness    Labs:                        12.9   9.71  )-----------( 281      ( 18 Jun 2021 07:02 )             37.3   06-18    140  |  104  |  17  ----------------------------<  228<H>  3.7   |  27  |  0.79    Ca    8.9      18 Jun 2021 07:03  Phos  3.7     06-17  Mg     2.1     06-17    CT Neck Soft Tissue w/ IV Cont (06.16.21 @ 11:22)     IMPRESSION: Dorsal neck cellulitis and myositis. No evidence for abscess at this time. Hypoattenuation of the thickened overlying skin, which could represent phlegmonous change.    D/D:  Infected Carbuncle with superimposed Cellulitis and Myositis  Uncontrolled Type 2 DM with Hyperglycemia due to   Non compliance with medical Rx    Plan:  Discussed with ID Dr. Roberts; Continue Unasyn  Discussed with Sx Dr. Johnston; Saw patient at bedside; Agreed I&D;   Continue  NPO after Midnight for OR  Patient is optimized at low risk for planned I&D  Endo consult appreciated; Basal and Bolus Insulin at least temporarily  Nutritional consult and Diabetic teaching for Insulin administration  Once sugars better controlled will consider oral hypoglycemics on discharge if Endo agrees  Will start Metformin 500 mg BID AM  DVT ppx    Discussed with Patient findings and plan of care  Discussed with PGY1 Dr. Worrell and RN Patient seen and examined this morning with Dr. Johnston    41 year old male with PMHx of T2DM (not on medications) who is coming with complaints of posterior neck swelling, pain and discharge for the past 3 days, He has Hx DM x 5 yrs was on meds Metformin but according to patient PCP discontinued after he was doing Diet and Exercise. He has not seen a Physician in long time. Has these type of eruptions but never needed antibiotics. Works in construction.     Patient admitted with recurrent Carbuncle infected with pus drainage    Patient is doing okay; seen by ID on IV ABX; scheduled for OR I&D     Vital Signs Last 24 Hrs  T(C): 36.9 (18 Jun 2021 17:56), Max: 36.9 (18 Jun 2021 05:25)  T(F): 98.4 (18 Jun 2021 17:56), Max: 98.4 (18 Jun 2021 05:25)  HR: 63 (18 Jun 2021 18:56) (53 - 72)  BP: 125/72 (18 Jun 2021 18:56) (103/56 - 131/70)  BP(mean): 87 (18 Jun 2021 18:56) (84 - 88)  RR: 18 (18 Jun 2021 18:56) (17 - 20)  SpO2: 99% (18 Jun 2021 18:56) (97% - 99%)    P/E:  Middle aged male, comfortable at rest, NAD  HEENT: Posterior head/ neck hard indurated area somewhat softening and more fluctuant with opening draining pus  CVS: S1S2 present, regular  Resp: BLAE+, No wheeze or Rhonchi  GI: Soft, BS+, NT  Extr: No edema or calf tenderness    Labs:                        12.9   9.71  )-----------( 281      ( 18 Jun 2021 07:02 )             37.3   06-18    140  |  104  |  17  ----------------------------<  228<H>  3.7   |  27  |  0.79    Ca    8.9      18 Jun 2021 07:03    HIV-1/2 Antigen/Antibody Screen by [facility] (06.17.21 @ 09:45) HIV-1/2 Combo Result: Nonreact:  Vitamin D, 25-Hydroxy (06.17.21 @ 10:47)  Vitamin D, 25-Hydroxy: 10.3:      CT Neck Soft Tissue w/ IV Cont (06.16.21 @ 11:22)     IMPRESSION: Dorsal neck cellulitis and myositis. No evidence for abscess at this time. Hypoattenuation of the thickened overlying skin, which could represent phlegmonous change.    D/D:  Infected Carbuncle with superimposed Cellulitis and Myositis  Uncontrolled Type 2 DM with Hyperglycemia due to   Non compliance with medical Rx    Plan:  Discussed with ID Dr. Roberts; Continue Unasyn  Discussed with Sx Dr. Johnston; Saw patient at bedside; Agreed I&D;   Continue  NPO after Midnight for OR  Patient is optimized at low risk for planned I&D  Endo consult appreciated; Basal and Bolus Insulin at least temporarily  Nutritional consult and Diabetic teaching for Insulin administration  Once sugars better controlled will consider oral hypoglycemics on discharge if Endo agrees  Will start Metformin 500 mg BID AM post OR today  Follow up OR I&D Cx to adjust antibiotics    Discussed with Patient findings and plan of care  Discussed with PGY1 Dr. Worrell and RN  Discussed with ID and Sx

## 2021-06-18 NOTE — DISCHARGE NOTE PROVIDER - CARE PROVIDER_API CALL
Mary Ahmadi)  EndocrinologyMetabDiabetes  [address] [place] [address]  Phone: [phone]  Fax: [phone]  Follow Up Time: 1 week    Chayito Longoria; MPH)  Internal Medicine; Preventive Medicine  [address] [place] [address]  Phone: [phone]  Fax: [phone]  Follow Up Time: 1 week   Mary Ahmadi)  EndocrinologyMetabDiabetes  [address] [place] [address]  Phone: [phone]  Fax: [phone]  Follow Up Time: 1 week    Chayito Longoria; MPH)  Internal Medicine; Preventive Medicine  [address] [place] [address]  Phone: [phone]  Fax: [phone]  Follow Up Time: 1 week    Marcial Johnston)  Surgery  [address] [facility] [place] [address]  Phone: [phone]  Fax: [phone]  Follow Up Time: 2 weeks

## 2021-06-18 NOTE — DISCHARGE NOTE PROVIDER - NSDCCPCAREPLAN_GEN_ALL_CORE_FT
PRINCIPAL DISCHARGE DIAGNOSIS  Diagnosis: Cellulitis of neck  Assessment and Plan of Treatment: You had a carbuncle in the neck that was treated with IV antibiotics with Incision and drainage. Please take your antibiotics ____ until ____. Please follow up with your primary care physician in one week to inform them of your recent hospitalization and further management of your medical conditions.        SECONDARY DISCHARGE DIAGNOSES  Diagnosis: Uncontrolled diabetes mellitus  Assessment and Plan of Treatment: HbA1C on admission was 12.2%, normal A1c is <6.5%. This is a reflection of your blood sugat level over the last 3 months.   You are started on INSULIN. You were shown how to take your blood sugars and administer insulin during your hospitalization. Please take LANTUS ____ at bed time and LISPRO ____ 3x a day before meals. Please keep a record of your blood sugars to bring to your appointment with your endocrinologist for the adjustment of your insulin dosages.  You must maintain a healthy diet that consists of low sugar and low fat. Your diet must consist of mostly vegetables. Please limit your intake of high sugar and high carbohydrate foods such as pasta, rice, and bread. Exercise frequently if possible. Follow up with primary care physician within one week of your discharge to further manage your diabetes and monitor your Hemoglobin A1c levels after 3 months to evaluate your diabetes control.       PRINCIPAL DISCHARGE DIAGNOSIS  Diagnosis: Cellulitis of neck  Assessment and Plan of Treatment: You had a carbuncle in the neck that was treated with IV antibiotics with Incision and drainage. Please take your antibiotic AUGMENTIN twice daily for 10 days until June 30th. You will need to apply clindamycin cream daily 1 week before and 1 week after getting your hair cut. Please follow up with your primary care physician in one week to inform them of your recent hospitalization and further management of your medical conditions.        SECONDARY DISCHARGE DIAGNOSES  Diagnosis: Uncontrolled diabetes mellitus  Assessment and Plan of Treatment: HbA1C on admission was 12.2%, normal A1c is <6.5%. This is a reflection of your blood sugat level over the last 3 months. You were shown how to take your blood sugars and administer insulin during your hospitalization. Please take METFORMIN 500mg TWICE DAILY. Please keep a record of your blood sugars to bring to your appointment.   You must maintain a healthy diet that consists of low sugar and low fat. Your diet must consist of mostly vegetables. Please limit your intake of high sugar and high carbohydrate foods such as pasta, rice, and bread. Exercise frequently if possible. Follow up with primary care physician within one week of your discharge to further manage your diabetes and monitor your Hemoglobin A1c levels after 3 months to evaluate your diabetes control.       PRINCIPAL DISCHARGE DIAGNOSIS  Diagnosis: Cellulitis of neck  Assessment and Plan of Treatment: You presented to hospital with collection in the back of your head draining serousanguinoeous discharge.   You had a carbuncle in the neck that was treated with Intravenous antibiotics and also seen by Surgery and with Incision and drainage on 6/18/21. You was seen by Infectious diseases Dr. dennis Roberts. You was treated with Unasyn initially then switched to vancomycin once cultures from OR showed Staphylococcus Aureus.  Identification returned Methicillin sensitive (MSSA) and not MRSA. ID recommended discharge on oral Augmentin.   Please take your antibiotic AUGMENTIN twice daily for 10 days until June 30th. You will need to apply clindamycin cream daily 1 week before and 1 week after getting your hair cut.  YOU DO NOT HAVE A PCP CURRENTLY; YOU SHOULD FOLLOW UP WITH JAMILAH NICHOLSON AT [facility] TO ESTABLISH CONTINUED CARE. IF YOU ARE UNABLE TO MAKE APPOINTMENT, PLEASE CALL US BACK.         SECONDARY DISCHARGE DIAGNOSES  Diagnosis: Uncontrolled diabetes mellitus  Assessment and Plan of Treatment: You have a history of Diabetes but was not taking any medications for long time. Your sugars were elevated to 300 plus on admission.   HbA1C on admission was 12.2%, normal A1c is <6.5%. This is a reflection of your blood sugat level over the last 3 months. You were shown how to take your blood sugars and administer insulin during your hospitalization. Sugars were initially controlled with Basal Insulin Lantus and was transitioned to Metformin with good control of sugars.   You was seen by Endocrinologist Dr. Mary Ahmadi.  Please take METFORMIN 500mg TWICE DAILY. Please keep a record of your blood sugars to bring to your appointment.  Exercise 30 mins daily at least 5 days a week to improve sugar and blood pressure control.    You must maintain a healthy diet that consists of low sugar and low fat. Your diet must consist of mostly vegetables. Please limit your intake of high sugar and high carbohydrate foods such as pasta, rice, and bread. Exercise frequently if possible. Follow up with primary care physician within one week of your discharge to further manage your diabetes and monitor your Hemoglobin A1c levels after 3 months to evaluate your diabetes control.       PRINCIPAL DISCHARGE DIAGNOSIS  Diagnosis: Cellulitis of neck  Assessment and Plan of Treatment: You presented to hospital with collection in the back of your head draining serousanguinoeous discharge.   You had a carbuncle in the neck that was treated with Intravenous antibiotics and also seen by Surgery and with Incision and drainage on 6/18/21. You was seen by Infectious diseases Dr. dennis Roberts. You was treated with Unasyn initially then switched to vancomycin once cultures from OR showed Staphylococcus Aureus.  Identification returned Methicillin sensitive (MSSA) and not MRSA. ID recommended discharge on oral Augmentin.   Please take your antibiotic AUGMENTIN twice daily for 10 days until June 30th. You will need to apply clindamycin cream daily 1 week before and 1 week after getting your hair cut.  You likely have this recurrent boils developing due to poorly controlled sugars.  YOU DO NOT HAVE A PCP CURRENTLY; YOU SHOULD FOLLOW UP WITH JAMILAH NICHOLSON AT [facility] TO ESTABLISH CONTINUED CARE. IF YOU ARE UNABLE TO MAKE APPOINTMENT, PLEASE CALL US BACK.         SECONDARY DISCHARGE DIAGNOSES  Diagnosis: Uncontrolled diabetes mellitus  Assessment and Plan of Treatment: You have a history of Diabetes but was not taking any medications for long time. Your sugars were elevated to 300 plus on admission.   HbA1C on admission was 12.2%, normal A1c is <6.5%. This is a reflection of your blood sugat level over the last 3 months. You were shown how to take your blood sugars and administer insulin during your hospitalization. Sugars were initially controlled with Basal Insulin Lantus and was transitioned to Metformin with good control of sugars.   You was seen by Endocrinologist Dr. Mary Ahmadi.  Please take METFORMIN 500mg TWICE DAILY. Please keep a record of your blood sugars to bring to your appointment.  Exercise 30 mins daily at least 5 days a week to improve sugar and blood pressure control.    You must maintain a healthy diet that consists of low sugar and low fat. Your diet must consist of mostly vegetables. Please limit your intake of high sugar and high carbohydrate foods such as pasta, rice, and bread. Exercise frequently if possible. Follow up with primary care physician within one week of your discharge to further manage your diabetes and monitor your Hemoglobin A1c levels after 3 months to evaluate your diabetes control.       PRINCIPAL DISCHARGE DIAGNOSIS  Diagnosis: Cellulitis of neck  Assessment and Plan of Treatment: You presented to hospital with collection in the back of your head draining serousanguinoeous discharge.   You had a carbuncle in the neck that was treated with Intravenous antibiotics and also seen by Surgery and with Incision and drainage on 6/18/21. You was seen by Infectious diseases Dr. dennis Roberts. You were treated with Unasyn initially then switched to vancomycin once cultures from OR showed Staphylococcus Aureus.  Identification returned Methicillin sensitive (MSSA) and not MRSA. ID recommended discharge on oral Augmentin.   Please take your antibiotic AUGMENTIN twice daily for 10 days until June 30th. You will need to apply clindamycin cream daily 1 week before and 1 week after getting your hair cut.  You likely have this recurrent boils developing due to poorly controlled sugars.  YOU DO NOT HAVE A PCP CURRENTLY; YOU SHOULD FOLLOW UP WITH JAMILAH NICHOLSON AT [facility] TO ESTABLISH CONTINUED CARE. IF YOU ARE UNABLE TO MAKE APPOINTMENT, PLEASE CALL US BACK.         SECONDARY DISCHARGE DIAGNOSES  Diagnosis: Uncontrolled diabetes mellitus  Assessment and Plan of Treatment: You have a history of Diabetes but was not taking any medications for long time. Your sugars were elevated to 300 plus on admission.   HbA1C on admission was 12.2%, normal A1c is <6.5%. This is a reflection of your blood sugar level over the last 3 months. You were shown how to take your blood sugars and administer insulin during your hospitalization. Sugars were initially controlled with Basal Insulin Lantus and was transitioned to Metformin with good control of sugars.   You were seen by Endocrinologist Dr. Mary Ahmadi.  Please take METFORMIN 500mg TWICE DAILY. Please keep a record of your blood sugars to bring to your appointment.  Exercise 30 mins daily at least 5 days a week to improve sugar and blood pressure control.    You must maintain a healthy diet that consists of low sugar and low fat. Your diet must consist of mostly vegetables. Please limit your intake of high sugar and high carbohydrate foods such as pasta, rice, and bread. Exercise frequently if possible. Follow up with primary care physician within one week of your discharge to further manage your diabetes and monitor your Hemoglobin A1c levels after 3 months to evaluate your diabetes control.    Diagnosis: Vitamin D deficiency  Assessment and Plan of Treatment: You were noted to have low vitamin D levels at 10.3 which is considered Vitamin D deficiency. You are started on vitamin D 50,000 units once a week. You will need to take this for 8-12 weeks and you will need your levels retested. Once your Vitamin D levels are at 30, you may switched to 1000 units daily (maintenance dose).

## 2021-06-18 NOTE — DISCHARGE NOTE PROVIDER - CARE PROVIDERS DIRECT ADDRESSES
,DirectAddress_Unknown,[email or web address] ,DirectAddress_Unknown,[email or web address]

## 2021-06-19 LAB
ANION GAP SERPL CALC-SCNC: 5 MMOL/L — SIGNIFICANT CHANGE UP (ref 5–17)
BASOPHILS # BLD AUTO: 0.06 K/UL — SIGNIFICANT CHANGE UP (ref 0–0.2)
BASOPHILS NFR BLD AUTO: 0.6 % — SIGNIFICANT CHANGE UP (ref 0–2)
BUN SERPL-MCNC: 14 MG/DL — SIGNIFICANT CHANGE UP (ref 7–18)
CALCIUM SERPL-MCNC: 8.7 MG/DL — SIGNIFICANT CHANGE UP (ref 8.4–10.5)
CHLORIDE SERPL-SCNC: 107 MMOL/L — SIGNIFICANT CHANGE UP (ref 96–108)
CO2 SERPL-SCNC: 28 MMOL/L — SIGNIFICANT CHANGE UP (ref 22–31)
CREAT SERPL-MCNC: 0.84 MG/DL — SIGNIFICANT CHANGE UP (ref 0.5–1.3)
EOSINOPHIL # BLD AUTO: 0.28 K/UL — SIGNIFICANT CHANGE UP (ref 0–0.5)
EOSINOPHIL NFR BLD AUTO: 3 % — SIGNIFICANT CHANGE UP (ref 0–6)
GLUCOSE BLDC GLUCOMTR-MCNC: 120 MG/DL — HIGH (ref 70–99)
GLUCOSE BLDC GLUCOMTR-MCNC: 177 MG/DL — HIGH (ref 70–99)
GLUCOSE BLDC GLUCOMTR-MCNC: 180 MG/DL — HIGH (ref 70–99)
GLUCOSE BLDC GLUCOMTR-MCNC: 183 MG/DL — HIGH (ref 70–99)
GLUCOSE SERPL-MCNC: 122 MG/DL — HIGH (ref 70–99)
HCT VFR BLD CALC: 33.8 % — LOW (ref 39–50)
HCT VFR BLD CALC: 34.8 % — LOW (ref 39–50)
HGB BLD-MCNC: 11.6 G/DL — LOW (ref 13–17)
HGB BLD-MCNC: 11.9 G/DL — LOW (ref 13–17)
IMM GRANULOCYTES NFR BLD AUTO: 0.4 % — SIGNIFICANT CHANGE UP (ref 0–1.5)
LYMPHOCYTES # BLD AUTO: 3.13 K/UL — SIGNIFICANT CHANGE UP (ref 1–3.3)
LYMPHOCYTES # BLD AUTO: 33.1 % — SIGNIFICANT CHANGE UP (ref 13–44)
MCHC RBC-ENTMCNC: 28.8 PG — SIGNIFICANT CHANGE UP (ref 27–34)
MCHC RBC-ENTMCNC: 29.1 PG — SIGNIFICANT CHANGE UP (ref 27–34)
MCHC RBC-ENTMCNC: 34.2 GM/DL — SIGNIFICANT CHANGE UP (ref 32–36)
MCHC RBC-ENTMCNC: 34.3 GM/DL — SIGNIFICANT CHANGE UP (ref 32–36)
MCV RBC AUTO: 84.3 FL — SIGNIFICANT CHANGE UP (ref 80–100)
MCV RBC AUTO: 84.9 FL — SIGNIFICANT CHANGE UP (ref 80–100)
MONOCYTES # BLD AUTO: 0.7 K/UL — SIGNIFICANT CHANGE UP (ref 0–0.9)
MONOCYTES NFR BLD AUTO: 7.4 % — SIGNIFICANT CHANGE UP (ref 2–14)
NEUTROPHILS # BLD AUTO: 5.24 K/UL — SIGNIFICANT CHANGE UP (ref 1.8–7.4)
NEUTROPHILS NFR BLD AUTO: 55.5 % — SIGNIFICANT CHANGE UP (ref 43–77)
NRBC # BLD: 0 /100 WBCS — SIGNIFICANT CHANGE UP (ref 0–0)
NRBC # BLD: 0 /100 WBCS — SIGNIFICANT CHANGE UP (ref 0–0)
PLATELET # BLD AUTO: 294 K/UL — SIGNIFICANT CHANGE UP (ref 150–400)
PLATELET # BLD AUTO: 294 K/UL — SIGNIFICANT CHANGE UP (ref 150–400)
POTASSIUM SERPL-MCNC: 3.5 MMOL/L — SIGNIFICANT CHANGE UP (ref 3.5–5.3)
POTASSIUM SERPL-SCNC: 3.5 MMOL/L — SIGNIFICANT CHANGE UP (ref 3.5–5.3)
RBC # BLD: 3.98 M/UL — LOW (ref 4.2–5.8)
RBC # BLD: 4.13 M/UL — LOW (ref 4.2–5.8)
RBC # FLD: 11.3 % — SIGNIFICANT CHANGE UP (ref 10.3–14.5)
RBC # FLD: 11.3 % — SIGNIFICANT CHANGE UP (ref 10.3–14.5)
SODIUM SERPL-SCNC: 140 MMOL/L — SIGNIFICANT CHANGE UP (ref 135–145)
WBC # BLD: 8.17 K/UL — SIGNIFICANT CHANGE UP (ref 3.8–10.5)
WBC # BLD: 9.45 K/UL — SIGNIFICANT CHANGE UP (ref 3.8–10.5)
WBC # FLD AUTO: 8.17 K/UL — SIGNIFICANT CHANGE UP (ref 3.8–10.5)
WBC # FLD AUTO: 9.45 K/UL — SIGNIFICANT CHANGE UP (ref 3.8–10.5)

## 2021-06-19 PROCEDURE — 99232 SBSQ HOSP IP/OBS MODERATE 35: CPT | Mod: GC

## 2021-06-19 RX ORDER — METFORMIN HYDROCHLORIDE 850 MG/1
500 TABLET ORAL
Refills: 0 | Status: DISCONTINUED | OUTPATIENT
Start: 2021-06-19 | End: 2021-06-20

## 2021-06-19 RX ORDER — INSULIN LISPRO 100/ML
VIAL (ML) SUBCUTANEOUS
Refills: 0 | Status: DISCONTINUED | OUTPATIENT
Start: 2021-06-19 | End: 2021-06-21

## 2021-06-19 RX ORDER — INSULIN GLARGINE 100 [IU]/ML
10 INJECTION, SOLUTION SUBCUTANEOUS AT BEDTIME
Refills: 0 | Status: DISCONTINUED | OUTPATIENT
Start: 2021-06-19 | End: 2021-06-20

## 2021-06-19 RX ADMIN — METFORMIN HYDROCHLORIDE 500 MILLIGRAM(S): 850 TABLET ORAL at 11:37

## 2021-06-19 RX ADMIN — Medication 2: at 11:38

## 2021-06-19 RX ADMIN — AMPICILLIN SODIUM AND SULBACTAM SODIUM 200 GRAM(S): 250; 125 INJECTION, POWDER, FOR SUSPENSION INTRAMUSCULAR; INTRAVENOUS at 03:18

## 2021-06-19 RX ADMIN — AMPICILLIN SODIUM AND SULBACTAM SODIUM 200 GRAM(S): 250; 125 INJECTION, POWDER, FOR SUSPENSION INTRAMUSCULAR; INTRAVENOUS at 17:10

## 2021-06-19 RX ADMIN — INSULIN GLARGINE 10 UNIT(S): 100 INJECTION, SOLUTION SUBCUTANEOUS at 22:15

## 2021-06-19 RX ADMIN — Medication 2: at 22:15

## 2021-06-19 RX ADMIN — Medication 4 UNIT(S): at 08:20

## 2021-06-19 RX ADMIN — METFORMIN HYDROCHLORIDE 500 MILLIGRAM(S): 850 TABLET ORAL at 17:10

## 2021-06-19 RX ADMIN — Medication 2: at 17:10

## 2021-06-19 RX ADMIN — AMPICILLIN SODIUM AND SULBACTAM SODIUM 200 GRAM(S): 250; 125 INJECTION, POWDER, FOR SUSPENSION INTRAMUSCULAR; INTRAVENOUS at 08:28

## 2021-06-19 NOTE — PROGRESS NOTE ADULT - SUBJECTIVE AND OBJECTIVE BOX
INTERVAL HPI/OVERNIGHT EVENTS:    No acute events overnight.   Pt resting comfortably. No acute complaints.       MEDICATIONS  (STANDING):  ampicillin/sulbactam  IVPB 3 Gram(s) IV Intermittent every 6 hours  ergocalciferol 28996 Unit(s) Oral <User Schedule>  insulin glargine Injectable (LANTUS) 10 Unit(s) SubCutaneous at bedtime  insulin lispro (ADMELOG) corrective regimen sliding scale   SubCutaneous Before meals and at bedtime  metFORMIN 500 milliGRAM(s) Oral two times a day    MEDICATIONS  (PRN):  acetaminophen   Tablet .. 650 milliGRAM(s) Oral every 4 hours PRN Mild Pain (1 - 3)  ketorolac   Injectable 15 milliGRAM(s) IV Push every 6 hours PRN Moderate Pain (4 - 6)      Vital Signs Last 24 Hrs  T(C): 36.8 (19 Jun 2021 05:23), Max: 36.9 (18 Jun 2021 17:56)  T(F): 98.2 (19 Jun 2021 05:23), Max: 98.4 (18 Jun 2021 17:56)  HR: 59 (19 Jun 2021 05:23) (53 - 72)  BP: 109/68 (19 Jun 2021 05:23) (109/68 - 131/70)  BP(mean): 100 (18 Jun 2021 19:40) (84 - 101)  RR: 17 (19 Jun 2021 05:23) (16 - 20)  SpO2: 97% (19 Jun 2021 05:23) (97% - 100%)      PHYSICAL EXAM  General: Alert and oriented, not in acute distress  Resp: Breathing unlabored  Neck: incision c/d/i, dressing changed    I&O's Detail    18 Jun 2021 07:01  -  19 Jun 2021 07:00  --------------------------------------------------------  IN:    Lactated Ringers Bolus: 300 mL  Total IN: 300 mL    OUT:  Total OUT: 0 mL    Total NET: 300 mL          LABS:                        11.9   8.17  )-----------( 294      ( 19 Jun 2021 12:12 )             34.8             06-19    140  |  107  |  14  ----------------------------<  122<H>  3.5   |  28  |  0.84    Ca    8.7      19 Jun 2021 06:24    TPro  7.3  /  Alb  3.1<L>  /  TBili  0.3  /  DBili  x   /  AST  27  /  ALT  47  /  AlkPhos  99  06-18       INTERVAL HPI/OVERNIGHT EVENTS:    No acute events overnight.   Pt resting comfortably. No acute complaints.       MEDICATIONS  (STANDING):  ampicillin/sulbactam  IVPB 3 Gram(s) IV Intermittent every 6 hours  ergocalciferol 94831 Unit(s) Oral <User Schedule>  insulin glargine Injectable (LANTUS) 10 Unit(s) SubCutaneous at bedtime  insulin lispro (ADMELOG) corrective regimen sliding scale   SubCutaneous Before meals and at bedtime  metFORMIN 500 milliGRAM(s) Oral two times a day    MEDICATIONS  (PRN):  acetaminophen   Tablet .. 650 milliGRAM(s) Oral every 4 hours PRN Mild Pain (1 - 3)  ketorolac   Injectable 15 milliGRAM(s) IV Push every 6 hours PRN Moderate Pain (4 - 6)      Vital Signs Last 24 Hrs  T(C): 36.8 (19 Jun 2021 05:23), Max: 36.9 (18 Jun 2021 17:56)  T(F): 98.2 (19 Jun 2021 05:23), Max: 98.4 (18 Jun 2021 17:56)  HR: 59 (19 Jun 2021 05:23) (53 - 72)  BP: 109/68 (19 Jun 2021 05:23) (109/68 - 131/70)  BP(mean): 100 (18 Jun 2021 19:40) (84 - 101)  RR: 17 (19 Jun 2021 05:23) (16 - 20)  SpO2: 97% (19 Jun 2021 05:23) (97% - 100%)      PHYSICAL EXAM  General: Alert and oriented, not in acute distress  Resp: Breathing unlabored  Neck: incision c/d/i, mild oozing, dressing changed    I&O's Detail    18 Jun 2021 07:01  -  19 Jun 2021 07:00  --------------------------------------------------------  IN:    Lactated Ringers Bolus: 300 mL  Total IN: 300 mL    OUT:  Total OUT: 0 mL    Total NET: 300 mL          LABS:                        11.9   8.17  )-----------( 294      ( 19 Jun 2021 12:12 )             34.8             06-19    140  |  107  |  14  ----------------------------<  122<H>  3.5   |  28  |  0.84    Ca    8.7      19 Jun 2021 06:24    TPro  7.3  /  Alb  3.1<L>  /  TBili  0.3  /  DBili  x   /  AST  27  /  ALT  47  /  AlkPhos  99  06-18

## 2021-06-19 NOTE — PROGRESS NOTE ADULT - SUBJECTIVE AND OBJECTIVE BOX
PGY-1 Progress Note discussed with attending    PAGER #: [[phone]] TILL 5:00 PM  PLEASE CONTACT ON CALL TEAM:  - On Call Team (Please refer to Prieto) FROM 5:00 PM - 8:30PM  - Nightfloat Team FROM 8:30 -7:30 AM    INTERVAL HPI/OVERNIGHT EVENTS:   Pt underwent I&D overnight with 5mL noted blood loss. Pt repeat hb overnight 13. Hb this am 11.6. Will repeat Hb. AM sugars 120. Pt without acute complaints at this time. Surgery to change dressing this AM.     REVIEW OF SYSTEMS:  CONSTITUTIONAL: No fever, weight loss, or fatigue  RESPIRATORY: No cough, wheezing, chills or hemoptysis; No shortness of breath  CARDIOVASCULAR: No chest pain, palpitations, dizziness, or leg swelling  GASTROINTESTINAL: No abdominal pain. No nausea, vomiting, or hematemesis; No diarrhea or constipation. No melena or hematochezia.  GENITOURINARY: No dysuria or hematuria, urinary frequency  NEUROLOGICAL: No headaches, memory loss, loss of strength, numbness, or tremors  SKIN: No itching, burning, rashes, or lesions     Vital Signs Last 24 Hrs  T(C): 36.8 (19 Jun 2021 05:23), Max: 36.9 (18 Jun 2021 17:56)  T(F): 98.2 (19 Jun 2021 05:23), Max: 98.4 (18 Jun 2021 17:56)  HR: 59 (19 Jun 2021 05:23) (53 - 72)  BP: 109/68 (19 Jun 2021 05:23) (109/68 - 131/70)  BP(mean): 100 (18 Jun 2021 19:40) (84 - 101)  RR: 17 (19 Jun 2021 05:23) (16 - 20)  SpO2: 97% (19 Jun 2021 05:23) (97% - 100%)    PHYSICAL EXAMINATION:  GENERAL: NAD, well built  HEAD:  Atraumatic, Normocephalic  EYES:  conjunctiva and sclera clear  NECK: Supple, No JVD, Normal thyroid  CHEST/LUNG: Clear to auscultation. Clear to percussion bilaterally; No rales, rhonchi, wheezing, or rubs  HEART: Regular rate and rhythm; No murmurs, rubs, or gallops  ABDOMEN: Soft, Nontender, Nondistended; Bowel sounds present  NERVOUS SYSTEM:  Alert & Oriented X3,    EXTREMITIES:  2+ Peripheral Pulses, No clubbing, cyanosis, or edema  SKIN: warm dry (+) occipital area of head dressed with sanguinous discharge on dressing.                          11.6   9.45  )-----------( 294      ( 19 Jun 2021 06:24 )             33.8     06-19    140  |  107  |  14  ----------------------------<  122<H>  3.5   |  28  |  0.84    Ca    8.7      19 Jun 2021 06:24    TPro  7.3  /  Alb  3.1<L>  /  TBili  0.3  /  DBili  x   /  AST  27  /  ALT  47  /  AlkPhos  99  06-18    LIVER FUNCTIONS - ( 18 Jun 2021 20:55 )  Alb: 3.1 g/dL / Pro: 7.3 g/dL / ALK PHOS: 99 U/L / ALT: 47 U/L DA / AST: 27 U/L / GGT: x                   CAPILLARY BLOOD GLUCOSE      RADIOLOGY & ADDITIONAL TESTS:                   PGY-1 Progress Note discussed with attending    PAGER #: [[phone]] TILL 5:00 PM  PLEASE CONTACT ON CALL TEAM:  - On Call Team (Please refer to Prieto) FROM 5:00 PM - 8:30PM  - Nightfloat Team FROM 8:30 -7:30 AM    INTERVAL HPI/OVERNIGHT EVENTS:   Pt underwent I&D overnight with 5mL noted blood loss. Pt repeat hb overnight 13. Hb this am 11.6. Will repeat Hb. AM sugars 120 without Lantus 6/18PM. Pt without acute complaints at this time. Surgery to change dressing this AM.    REVIEW OF SYSTEMS:  CONSTITUTIONAL: No fever, weight loss, or fatigue  RESPIRATORY: No cough, wheezing, chills or hemoptysis; No shortness of breath  CARDIOVASCULAR: No chest pain, palpitations, dizziness, or leg swelling  GASTROINTESTINAL: No abdominal pain. No nausea, vomiting, or hematemesis; No diarrhea or constipation. No melena or hematochezia.  GENITOURINARY: No dysuria or hematuria, urinary frequency  NEUROLOGICAL: No headaches, memory loss, loss of strength, numbness, or tremors  SKIN: No itching, burning, rashes, or lesions     Vital Signs Last 24 Hrs  T(C): 36.8 (19 Jun 2021 05:23), Max: 36.9 (18 Jun 2021 17:56)  T(F): 98.2 (19 Jun 2021 05:23), Max: 98.4 (18 Jun 2021 17:56)  HR: 59 (19 Jun 2021 05:23) (53 - 72)  BP: 109/68 (19 Jun 2021 05:23) (109/68 - 131/70)  BP(mean): 100 (18 Jun 2021 19:40) (84 - 101)  RR: 17 (19 Jun 2021 05:23) (16 - 20)  SpO2: 97% (19 Jun 2021 05:23) (97% - 100%)    PHYSICAL EXAMINATION:  GENERAL: NAD, well built  HEAD:  Atraumatic, Normocephalic  EYES:  conjunctiva and sclera clear  NECK: Supple, No JVD, Normal thyroid  CHEST/LUNG: Clear to auscultation. Clear to percussion bilaterally; No rales, rhonchi, wheezing, or rubs  HEART: Regular rate and rhythm; No murmurs, rubs, or gallops  ABDOMEN: Soft, Nontender, Nondistended; Bowel sounds present  NERVOUS SYSTEM:  Alert & Oriented X3,    EXTREMITIES:  2+ Peripheral Pulses, No clubbing, cyanosis, or edema  SKIN: warm dry (+) occipital area of head dressed with sanguinous discharge on dressing.                          11.6   9.45  )-----------( 294      ( 19 Jun 2021 06:24 )             33.8     06-19    140  |  107  |  14  ----------------------------<  122<H>  3.5   |  28  |  0.84    Ca    8.7      19 Jun 2021 06:24    TPro  7.3  /  Alb  3.1<L>  /  TBili  0.3  /  DBili  x   /  AST  27  /  ALT  47  /  AlkPhos  99  06-18    LIVER FUNCTIONS - ( 18 Jun 2021 20:55 )  Alb: 3.1 g/dL / Pro: 7.3 g/dL / ALK PHOS: 99 U/L / ALT: 47 U/L DA / AST: 27 U/L / GGT: x                   CAPILLARY BLOOD GLUCOSE      RADIOLOGY & ADDITIONAL TESTS:                   PGY-1 Progress Note discussed with attending    PAGER #: [[phone]] TILL 5:00 PM  PLEASE CONTACT ON CALL TEAM:  - On Call Team (Please refer to Prieto) FROM 5:00 PM - 8:30PM  - Nightfloat Team FROM 8:30 -7:30 AM    INTERVAL HPI/OVERNIGHT EVENTS:   Pt underwent I&D overnight with 5mL noted blood loss. Pt repeat hb overnight 13. Hb this am 11.6. Will repeat Hb. AM sugars 120 without Lantus 6/18PM. Pt without acute complaints at this time. Surgery to change dressing this AM.    REVIEW OF SYSTEMS:  CONSTITUTIONAL: No fever, weight loss, or fatigue  RESPIRATORY: No cough, wheezing, chills or hemoptysis; No shortness of breath  CARDIOVASCULAR: No chest pain, palpitations, dizziness, or leg swelling  GASTROINTESTINAL: No abdominal pain. No nausea, vomiting, or hematemesis; No diarrhea or constipation. No melena or hematochezia.  GENITOURINARY: No dysuria or hematuria, urinary frequency  NEUROLOGICAL: No headaches, memory loss, loss of strength, numbness, or tremors  SKIN: No itching, burning, rashes, or lesions     Vital Signs Last 24 Hrs  T(C): 36.8 (19 Jun 2021 05:23), Max: 36.9 (18 Jun 2021 17:56)  T(F): 98.2 (19 Jun 2021 05:23), Max: 98.4 (18 Jun 2021 17:56)  HR: 59 (19 Jun 2021 05:23) (53 - 72)  BP: 109/68 (19 Jun 2021 05:23) (109/68 - 131/70)  BP(mean): 100 (18 Jun 2021 19:40) (84 - 101)  RR: 17 (19 Jun 2021 05:23) (16 - 20)  SpO2: 97% (19 Jun 2021 05:23) (97% - 100%)    PHYSICAL EXAMINATION:  GENERAL: NAD, well built  HEAD:  Atraumatic, Normocephalic  EYES:  conjunctiva and sclera clear  NECK: Supple, No JVD, Normal thyroid  CHEST/LUNG: Clear to auscultation. Clear to percussion bilaterally; No rales, rhonchi, wheezing, or rubs  HEART: Regular rate and rhythm; No murmurs, rubs, or gallops  ABDOMEN: Soft, Nontender, Nondistended; Bowel sounds present  NERVOUS SYSTEM:  Alert & Oriented X3,    EXTREMITIES:  2+ Peripheral Pulses, No clubbing, cyanosis, or edema  SKIN: warm dry (+) occipital area of head dressed with sanguinous discharge on dressing.                          11.6   9.45  )-----------( 294      ( 19 Jun 2021 06:24 )             33.8     06-19    140  |  107  |  14  ----------------------------<  122<H>  3.5   |  28  |  0.84    Ca    8.7      19 Jun 2021 06:24    TPro  7.3  /  Alb  3.1<L>  /  TBili  0.3  /  DBili  x   /  AST  27  /  ALT  47  /  AlkPhos  99  06-18    LIVER FUNCTIONS - ( 18 Jun 2021 20:55 )  Alb: 3.1 g/dL / Pro: 7.3 g/dL / ALK PHOS: 99 U/L / ALT: 47 U/L DA / AST: 27 U/L / GGT: x             RADIOLOGY & ADDITIONAL TESTS:

## 2021-06-19 NOTE — PROGRESS NOTE ADULT - SUBJECTIVE AND OBJECTIVE BOX
41y Male    Meds:  ampicillin/sulbactam  IVPB 3 Gram(s) IV Intermittent every 6 hours    Allergies    No Known Allergies    Intolerances        VITALS:  Vital Signs Last 24 Hrs  T(C): 37.1 (19 Jun 2021 14:16), Max: 37.1 (19 Jun 2021 14:16)  T(F): 98.7 (19 Jun 2021 14:16), Max: 98.7 (19 Jun 2021 14:16)  HR: 56 (19 Jun 2021 14:16) (56 - 72)  BP: 120/72 (19 Jun 2021 14:16) (109/68 - 131/70)  BP(mean): 100 (18 Jun 2021 19:40) (84 - 101)  RR: 17 (19 Jun 2021 14:16) (16 - 20)  SpO2: 97% (19 Jun 2021 14:16) (97% - 100%)    LABS/DIAGNOSTIC TESTS:                          11.9   8.17  )-----------( 294      ( 19 Jun 2021 12:12 )             34.8         06-19    140  |  107  |  14  ----------------------------<  122<H>  3.5   |  28  |  0.84    Ca    8.7      19 Jun 2021 06:24    TPro  7.3  /  Alb  3.1<L>  /  TBili  0.3  /  DBili  x   /  AST  27  /  ALT  47  /  AlkPhos  99  06-18      LIVER FUNCTIONS - ( 18 Jun 2021 20:55 )  Alb: 3.1 g/dL / Pro: 7.3 g/dL / ALK PHOS: 99 U/L / ALT: 47 U/L DA / AST: 27 U/L / GGT: x             CULTURES: .Urine Clean Catch (Midstream)  06-16 @ 18:21   No growth  --  --      .Blood Blood-Peripheral  06-16 @ 13:24   No growth to date.  --  --            RADIOLOGY:      ROS:  [  ] UNABLE TO ELICIT 41y Male who is s/p I&D of his neck abscess, he is feeling much better today with very little pain in the neck region, he has bloody drainage from abscess. He has no fevers or chills or diarrhea. Awaiting culture results.    Meds:  ampicillin/sulbactam  IVPB 3 Gram(s) IV Intermittent every 6 hours    Allergies    No Known Allergies    Intolerances        VITALS:  Vital Signs Last 24 Hrs  T(C): 37.1 (19 Jun 2021 14:16), Max: 37.1 (19 Jun 2021 14:16)  T(F): 98.7 (19 Jun 2021 14:16), Max: 98.7 (19 Jun 2021 14:16)  HR: 56 (19 Jun 2021 14:16) (56 - 72)  BP: 120/72 (19 Jun 2021 14:16) (109/68 - 131/70)  BP(mean): 100 (18 Jun 2021 19:40) (84 - 101)  RR: 17 (19 Jun 2021 14:16) (16 - 20)  SpO2: 97% (19 Jun 2021 14:16) (97% - 100%)    LABS/DIAGNOSTIC TESTS:                          11.9   8.17  )-----------( 294      ( 19 Jun 2021 12:12 )             34.8         06-19    140  |  107  |  14  ----------------------------<  122<H>  3.5   |  28  |  0.84    Ca    8.7      19 Jun 2021 06:24    TPro  7.3  /  Alb  3.1<L>  /  TBili  0.3  /  DBili  x   /  AST  27  /  ALT  47  /  AlkPhos  99  06-18      LIVER FUNCTIONS - ( 18 Jun 2021 20:55 )  Alb: 3.1 g/dL / Pro: 7.3 g/dL / ALK PHOS: 99 U/L / ALT: 47 U/L DA / AST: 27 U/L / GGT: x             CULTURES: .Urine Clean Catch (Midstream)  06-16 @ 18:21   No growth  --  --      .Blood Blood-Peripheral  06-16 @ 13:24   No growth to date.  --  --            RADIOLOGY:      ROS:  [  ] UNABLE TO ELICIT

## 2021-06-19 NOTE — PROGRESS NOTE ADULT - PROBLEM SELECTOR PLAN 1
- Patient does not meet SIRS criteria on admission, WBC 12.84, lactate 0.9  - Exam shows pustule with satellite lesions, and induration  - Likely 2/2 to community acquired MRSA infection, especially due ton recurrent episodes   - c/w Unasyn 6/16  - HIV neg  - Will f/u with ID length of Abx tx and PO meds.  - ID Dr. Roberts   - Surgery Dr. Solis consulted - Patient does not meet SIRS criteria on admission, WBC 12.84, lactate 0.9  - Exam shows pustule with satellite lesions, and induration  - Likely 2/2 to community acquired MRSA infection, especially due ton recurrent episodes   - c/w Unasyn 6/16  - HIV neg  - Will f/u with ID length of Abx tx and PO meds + Wound cultures  - ID Dr. Roberts   - Surgery Dr. Solis consulted

## 2021-06-19 NOTE — PROGRESS NOTE ADULT - ATTENDING COMMENTS
Patient seen and examined this morning around 10 AM.     41 year old male with PMHx of T2DM (not on medications) who is coming with complaints of posterior neck swelling, pain and discharge for the past 3 days, He has Hx DM x 5 yrs was on meds Metformin but according to patient PCP discontinued after he was doing Diet and Exercise. He has not seen a Physician in long time. Has these type of eruptions but never needed antibiotics. Works in construction.     Patient admitted with recurrent Carbuncle infected with pus drainage s/p OR I&D yesterday    Patient is doing well; area around carbuncle appears softer.  denies fever, chills, SOB, palpitations, chest pain, nausea, vomiting, diarrhea, constipation, dizziness      Vital Signs Last 24 Hrs  T(C): 37.1 (19 Jun 2021 14:16), Max: 37.1 (19 Jun 2021 14:16)  T(F): 98.7 (19 Jun 2021 14:16), Max: 98.7 (19 Jun 2021 14:16)  HR: 56 (19 Jun 2021 14:16) (56 - 72)  BP: 120/72 (19 Jun 2021 14:16) (109/68 - 131/70)  BP(mean): 100 (18 Jun 2021 19:40) (84 - 101)  RR: 17 (19 Jun 2021 14:16) (16 - 20)  SpO2: 97% (19 Jun 2021 14:16) (97% - 100%)    P/E:  Middle aged male, comfortable at rest, NAD  HEENT: Posterior head/ neck hard indurated area , softer with dressing s/p I7D intact  CVS: S1S2 present, regular  Resp: BLAE+, No wheeze or Rhonchi  GI: Soft, BS+, NT  Extr: No edema or calf tenderness    Labs:                        12.9   9.71  )-----------( 281      ( 18 Jun 2021 07:02 )             37.3   06-18    140  |  104  |  17  ----------------------------<  228<H>  3.7   |  27  |  0.79    Ca    8.9      18 Jun 2021 07:03    HIV-1/2 Antigen/Antibody Screen by [facility] (06.17.21 @ 09:45) HIV-1/2 Combo Result: Nonreact:  Vitamin D, 25-Hydroxy (06.17.21 @ 10:47)  Vitamin D, 25-Hydroxy: 10.3:      CT Neck Soft Tissue w/ IV Cont (06.16.21 @ 11:22)     IMPRESSION: Dorsal neck cellulitis and myositis. No evidence for abscess at this time. Hypoattenuation of the thickened overlying skin, which could represent phlegmonous change.    D/D:  Infected Carbuncle with superimposed Cellulitis and Myositis  Uncontrolled Type 2 DM with Hyperglycemia due to   Non compliance with medical Rx  Vitamin D deficiency    Plan:  Discussed with ID Dr. Roberts; Continue Unasyn x 24 hrs more  Follow up OR Cx and sensitivity;  If remain stable D/C plan Home next 24 to 48 hrs  Endo follow up; Basal and Bolus Insulin at least temporarily  Nutritional consult and Diabetic teaching for Insulin administration  Will start Metformin 500 mg BID AM post OR today  Patient was advised to learn insulin administration in the event needs Insulin      Discussed with Patient findings and plan of care  Discussed with PGY1 Dr. Worrell and RN  Discussed with ID and Sx Patient seen and examined this morning around 10 AM.     41 year old male with PMHx of T2DM (not on medications) who is coming with complaints of posterior neck swelling, pain and discharge for the past 3 days, He has Hx DM x 5 yrs was on meds Metformin but according to patient PCP discontinued after he was doing Diet and Exercise. He has not seen a Physician in long time. Has these type of eruptions but never needed antibiotics. Works in construction.     Patient admitted with recurrent Carbuncle infected with pus drainage s/p OR I&D yesterday    Patient is doing well; area around carbuncle appears softer.  denies fever, chills, SOB, palpitations, chest pain, nausea, vomiting, diarrhea, constipation, dizziness      Vital Signs Last 24 Hrs  T(C): 37.1 (19 Jun 2021 14:16), Max: 37.1 (19 Jun 2021 14:16)  T(F): 98.7 (19 Jun 2021 14:16), Max: 98.7 (19 Jun 2021 14:16)  HR: 56 (19 Jun 2021 14:16) (56 - 72)  BP: 120/72 (19 Jun 2021 14:16) (109/68 - 131/70)  BP(mean): 100 (18 Jun 2021 19:40) (84 - 101)  RR: 17 (19 Jun 2021 14:16) (16 - 20)  SpO2: 97% (19 Jun 2021 14:16) (97% - 100%)    P/E:  Middle aged male, comfortable at rest, NAD  HEENT: Posterior head/ neck hard indurated area , softer with dressing s/p I7D intact  CVS: S1S2 present, regular  Resp: BLAE+, No wheeze or Rhonchi  GI: Soft, BS+, NT  Extr: No edema or calf tenderness    Labs:                         11.9   8.17  )-----------( 294      ( 19 Jun 2021 12:12 )             34.8     06-19    140  |  107  |  14  ----------------------------<  122<H>  3.5   |  28  |  0.84    Ca    8.7      19 Jun 2021 06:24    TPro  7.3  /  Alb  3.1<L>  /  TBili  0.3  /  DBili  x   /  AST  27  /  ALT  47  /  AlkPhos  99  06-18    HIV-1/2 Antigen/Antibody Screen by [facility] (06.17.21 @ 09:45) HIV-1/2 Combo Result: Nonreact:  Vitamin D, 25-Hydroxy (06.17.21 @ 10:47)  Vitamin D, 25-Hydroxy: 10.3:    CT Neck Soft Tissue w/ IV Cont (06.16.21 @ 11:22)     IMPRESSION: Dorsal neck cellulitis and myositis. No evidence for abscess at this time. Hypoattenuation of the thickened overlying skin, which could represent phlegmonous change.    D/D:  Infected Carbuncle with superimposed Cellulitis and Myositis s/p I&D POD # 1  Uncontrolled Type 2 DM with Hyperglycemia better controlled due to   Non compliance with medical Rx  Vitamin D deficiency    Plan:  Discussed with ID Dr. Roberts; Continue Unasyn x 24 hrs more  Follow up OR Cx and sensitivity;  If remain stable D/C plan Home next 24 to 48 hrs on oral Augmentin  Endo follow up; Basal and Bolus Insulin at least temporarily  Nutritional consult and Diabetic teaching for Insulin administration  Start Metformin 500 mg BID  Patient was advised to learn insulin administration in the event needs Insulin  Lsab stabl; will hold off AM      Discussed with Patient findings and plan of care  Discussed with PGY1 Dr. Worrell and RN  Discussed with ID and Sx

## 2021-06-19 NOTE — PROGRESS NOTE ADULT - PROBLEM SELECTOR PLAN 2
- Pt has history of uncontrolled DM not on any medications   - A1c 12.2  - lantus 20U HS, and 4 units Premeal admelog   - Acccarlo ACHS   - CC diet  - Endo Dr. Ahmadi - Pt has history of uncontrolled DM not on any medications   - A1c 12.2  - lantus 20U HS - will d/c for now  - 4 units Premeal admelog   - Accuchecks ACHS   - CC diet  - Endo Dr. Ahmadi - Pt has history of uncontrolled DM not on any medications   - A1c 12.2  - decrease lantus 10u qhs  - add metformin 500 bid  - d/c 4 units Premeal admelog   - Acccarlo ACHS   - CC diet  - Endo Dr. Ahmadi

## 2021-06-19 NOTE — PROGRESS NOTE ADULT - ASSESSMENT
41 yoM s/p I&D posterior neck abscess    afeb, vss; labs wnl    - abx  - f/u culture/sensitivity  - daily packing change, wound care order in  - care per primary team  - d/w attending     41 yoM s/p I&D posterior neck abscess    afeb, vss; labs wnl    - abx  - f/u culture/sensitivity  - daily packing change, wound care order in  - care, d/c per primary team  - d/w attending

## 2021-06-19 NOTE — PROGRESS NOTE ADULT - ASSESSMENT
Carbuncle of neck  Leukocytosis - normalized    Plan - Cont  Unasyn 3gms iv q6 hrs  await culture results.

## 2021-06-20 LAB
GLUCOSE BLDC GLUCOMTR-MCNC: 108 MG/DL — HIGH (ref 70–99)
GLUCOSE BLDC GLUCOMTR-MCNC: 120 MG/DL — HIGH (ref 70–99)
GLUCOSE BLDC GLUCOMTR-MCNC: 128 MG/DL — HIGH (ref 70–99)
GLUCOSE BLDC GLUCOMTR-MCNC: 152 MG/DL — HIGH (ref 70–99)

## 2021-06-20 PROCEDURE — 99232 SBSQ HOSP IP/OBS MODERATE 35: CPT

## 2021-06-20 RX ORDER — METFORMIN HYDROCHLORIDE 850 MG/1
1000 TABLET ORAL
Refills: 0 | Status: DISCONTINUED | OUTPATIENT
Start: 2021-06-20 | End: 2021-06-21

## 2021-06-20 RX ORDER — METFORMIN HYDROCHLORIDE 850 MG/1
500 TABLET ORAL ONCE
Refills: 0 | Status: COMPLETED | OUTPATIENT
Start: 2021-06-20 | End: 2021-06-20

## 2021-06-20 RX ORDER — VANCOMYCIN HCL 1 G
1000 VIAL (EA) INTRAVENOUS EVERY 12 HOURS
Refills: 0 | Status: DISCONTINUED | OUTPATIENT
Start: 2021-06-20 | End: 2021-06-21

## 2021-06-20 RX ADMIN — Medication 250 MILLIGRAM(S): at 17:39

## 2021-06-20 RX ADMIN — METFORMIN HYDROCHLORIDE 1000 MILLIGRAM(S): 850 TABLET ORAL at 17:39

## 2021-06-20 RX ADMIN — METFORMIN HYDROCHLORIDE 500 MILLIGRAM(S): 850 TABLET ORAL at 05:13

## 2021-06-20 RX ADMIN — METFORMIN HYDROCHLORIDE 500 MILLIGRAM(S): 850 TABLET ORAL at 12:40

## 2021-06-20 RX ADMIN — AMPICILLIN SODIUM AND SULBACTAM SODIUM 200 GRAM(S): 250; 125 INJECTION, POWDER, FOR SUSPENSION INTRAMUSCULAR; INTRAVENOUS at 05:13

## 2021-06-20 RX ADMIN — Medication 650 MILLIGRAM(S): at 06:00

## 2021-06-20 RX ADMIN — Medication 650 MILLIGRAM(S): at 05:13

## 2021-06-20 RX ADMIN — AMPICILLIN SODIUM AND SULBACTAM SODIUM 200 GRAM(S): 250; 125 INJECTION, POWDER, FOR SUSPENSION INTRAMUSCULAR; INTRAVENOUS at 08:18

## 2021-06-20 RX ADMIN — AMPICILLIN SODIUM AND SULBACTAM SODIUM 200 GRAM(S): 250; 125 INJECTION, POWDER, FOR SUSPENSION INTRAMUSCULAR; INTRAVENOUS at 00:05

## 2021-06-20 RX ADMIN — Medication 250 MILLIGRAM(S): at 12:40

## 2021-06-20 RX ADMIN — Medication 2: at 12:39

## 2021-06-20 NOTE — PROGRESS NOTE ADULT - RS GEN PE MLT RESP DETAILS PC
breath sounds equal/good air movement/clear to auscultation bilaterally/no rales/no rhonchi/no wheezes

## 2021-06-20 NOTE — PROGRESS NOTE ADULT - ASSESSMENT
Carbuncle of neck  Leukocytosis - normalized    Plan - Unasyn DCed  Started on Vancomycin 1gm iv q12hrs  await sensitivities but will plan to DC home tomorrow on po abxs.

## 2021-06-20 NOTE — PROGRESS NOTE ADULT - GENERAL
details…
On thorough examination, no obvious foreign body or contact lens was noted. Mohamud has no sensation of retained lens or foreign body, nor any signs of abrasions or vision changes. Follow-up with your eye doctor tomorrow for repeat exam. Return for severe pain, bleeding or changes in vision

## 2021-06-20 NOTE — PROGRESS NOTE ADULT - NEGATIVE GASTROINTESTINAL SYMPTOMS
no nausea/no vomiting/no diarrhea/no abdominal pain

## 2021-06-20 NOTE — PROGRESS NOTE ADULT - SKIN COMMENTS
cellulitis , erythema, swelling and tenderness over the neck region is dramatically better
skin thickening and nodularity over back of neck with a small ulceration with purulent drainage and an area of erythema , warmth redness and more tender over the lower right lateral portion of lower neck.
swelling, redness and redness of posterior neck has decreased dramatically, tenderness is less

## 2021-06-20 NOTE — CHART NOTE - NSCHARTNOTEFT_GEN_A_CORE
Patient seen and examined this morning around 10.30 AM    41 year old male with PMHx of T2DM (not on medications) who is coming with complaints of posterior neck swelling, pain and discharge for the past 3 days, He has Hx DM x 5 yrs was on meds Metformin but according to patient PCP discontinued after he was doing Diet and Exercise. He has not seen a Physician in long time. Has these type of eruptions but never needed antibiotics. Works in construction.     Patient admitted with recurrent Carbuncle infected with pus drainage s/p OR I&D 6/18    Patient is doing well; area around carbuncle appears softer. Sugars much better controlled  denies fever, chills, SOB, palpitations, chest pain, nausea, vomiting, diarrhea, constipation, dizziness  Wound Cx growing S. Aureus    Vital Signs Last 24 Hrs  T(C): 37.1 (20 Jun 2021 21:07), Max: 37.1 (20 Jun 2021 21:07)  T(F): 98.8 (20 Jun 2021 21:07), Max: 98.8 (20 Jun 2021 21:07)  HR: 59 (20 Jun 2021 21:07) (55 - 59)  BP: 134/80 (20 Jun 2021 21:07) (105/62 - 134/80)  RR: 16 (20 Jun 2021 21:07) (16 - 18)  SpO2: 98% (20 Jun 2021 21:07) (98% - 99%)    P/E:  Middle aged male, comfortable at rest, NAD  HEENT: Posterior head/ neck hard indurated area , softer with dressing s/p I&D intact  CVS: S1S2 present, regular  Resp: BLAE+, No wheeze or Rhonchi  GI: Soft, BS+, NT  Extr: No edema or calf tenderness    Labs: No new CBC/ BMP    Culture - Surgical Swab (06.19.21 @ 03:29)   Specimen Source: .Surgical Swab posterior neck abscess culture   Culture Results: Moderate Staphylococcus aureus     Culture - Urine (06.16.21 @ 18:21)   Specimen Source: .Urine Clean Catch (Midstream)   Culture Results: No growth     Culture - Blood (06.16.21 @ 13:24)   Specimen Source: .Blood Blood-Peripheral   Culture Results: No growth to date.  Culture - Blood (06.16.21 @ 13:24)   Specimen Source: .Blood Blood-Peripheral   Culture Results: No growth to date.       CT Neck Soft Tissue w/ IV Cont (06.16.21 @ 11:22)     IMPRESSION: Dorsal neck cellulitis and myositis. No evidence for abscess at this time. Hypoattenuation of the thickened overlying skin, which could represent phlegmonous change.    D/D:  Infected Carbuncle with superimposed Cellulitis and Myositis s/p I&D POD # 2  Wound Cx S. Aureus suspect MRSA  Uncontrolled Type 2 DM with Hyperglycemia much better controlled due to   Non compliance with medical Rx and possibly cellulitis  Vitamin D deficiency    Plan:  Discussed with ID Dr. Roberts; OR Cx and sensitivity S. Aureus; D/C Unasyn  Started Vancomycin 1 gm q 12 hrs with plan to switch to Doxy on dischagre  Will await identification of S. Aureus; will hold discharge until AM  Endo follow up appreciated; d/w Dr. Ahmadi given much better sugar control will D/C Lantus and increase Metformin to 1000 mg twice daily given patient will be staying overnight. Nutritional consult and Diabetic teaching for Insulin administration. Patient comfortable in injecting insulin as per patient.     Discussed with Patient findings and plan of care; agree with the plan above  Discussed with Sx THOMAS Whittaker; will f/u [facility]; may not need packing  Discussed with RN  Discussed with ID and Sx.  D/C Home expected tomorrow morning

## 2021-06-20 NOTE — PROGRESS NOTE ADULT - SUBJECTIVE AND OBJECTIVE BOX
INTERVAL HPI/OVERNIGHT EVENTS:    No acute events overnight.   Pt resting comfortably. No acute complaints.   denies f/c/n/v    MEDICATIONS  (STANDING):  ergocalciferol 28769 Unit(s) Oral <User Schedule>  insulin lispro (ADMELOG) corrective regimen sliding scale   SubCutaneous Before meals and at bedtime  metFORMIN 1000 milliGRAM(s) Oral two times a day  metFORMIN 500 milliGRAM(s) Oral once  vancomycin  IVPB 1000 milliGRAM(s) IV Intermittent every 12 hours    MEDICATIONS  (PRN):  acetaminophen   Tablet .. 650 milliGRAM(s) Oral every 4 hours PRN Mild Pain (1 - 3)  ketorolac   Injectable 15 milliGRAM(s) IV Push every 6 hours PRN Moderate Pain (4 - 6)      Vital Signs Last 24 Hrs  T(C): 36.7 (20 Jun 2021 05:19), Max: 37.1 (19 Jun 2021 14:16)  T(F): 98 (20 Jun 2021 05:19), Max: 98.7 (19 Jun 2021 14:16)  HR: 55 (20 Jun 2021 05:19) (55 - 69)  BP: 113/69 (20 Jun 2021 05:19) (113/69 - 124/73)  BP(mean): --  RR: 18 (20 Jun 2021 05:19) (16 - 18)  SpO2: 99% (20 Jun 2021 05:19) (96% - 99%)      PHYSICAL EXAM  General: Alert and oriented, not in acute distress  Resp: Breathing unlabored  neck: inc well granulating, scant oozing, nontender      I&O's Detail      LABS:                        11.9   8.17  )-----------( 294      ( 19 Jun 2021 12:12 )             34.8             06-19    140  |  107  |  14  ----------------------------<  122<H>  3.5   |  28  |  0.84    Ca    8.7      19 Jun 2021 06:24    TPro  7.3  /  Alb  3.1<L>  /  TBili  0.3  /  DBili  x   /  AST  27  /  ALT  47  /  AlkPhos  99  06-18

## 2021-06-20 NOTE — PROGRESS NOTE ADULT - NEGATIVE CARDIOVASCULAR SYMPTOMS
no chest pain/no dyspnea on exertion

## 2021-06-20 NOTE — PROGRESS NOTE ADULT - SUBJECTIVE AND OBJECTIVE BOX
41y Male    Meds:  vancomycin  IVPB 1000 milliGRAM(s) IV Intermittent every 12 hours    Allergies    No Known Allergies    Intolerances        VITALS:  Vital Signs Last 24 Hrs  T(C): 36.7 (20 Jun 2021 13:52), Max: 37 (19 Jun 2021 21:42)  T(F): 98.1 (20 Jun 2021 13:52), Max: 98.6 (19 Jun 2021 21:42)  HR: 57 (20 Jun 2021 13:52) (55 - 69)  BP: 105/62 (20 Jun 2021 13:52) (105/62 - 124/73)  BP(mean): --  RR: 18 (20 Jun 2021 13:52) (16 - 18)  SpO2: 99% (20 Jun 2021 13:52) (96% - 99%)    LABS/DIAGNOSTIC TESTS:                          11.9   8.17  )-----------( 294      ( 19 Jun 2021 12:12 )             34.8         06-19    140  |  107  |  14  ----------------------------<  122<H>  3.5   |  28  |  0.84    Ca    8.7      19 Jun 2021 06:24    TPro  7.3  /  Alb  3.1<L>  /  TBili  0.3  /  DBili  x   /  AST  27  /  ALT  47  /  AlkPhos  99  06-18      LIVER FUNCTIONS - ( 18 Jun 2021 20:55 )  Alb: 3.1 g/dL / Pro: 7.3 g/dL / ALK PHOS: 99 U/L / ALT: 47 U/L DA / AST: 27 U/L / GGT: x             CULTURES: .Surgical Swab posterior neck abscess culture  06-19 @ 03:29   Moderate Staphylococcus aureus  --  --      .Urine Clean Catch (Midstream)  06-16 @ 18:21   No growth  --  --      .Blood Blood-Peripheral  06-16 @ 13:24   No growth to date.  --  --            RADIOLOGY:      ROS:  [  ] UNABLE TO ELICIT 41y Male who is growing out Staph Aureus from his neck abscess but sensitivities aren't back yet , we switched him over to Vancomycin pending sensitivities. He is feeling and looking better, he has minimal neck pain. He has no fevers or chills , no diarrhea.     Meds:  vancomycin  IVPB 1000 milliGRAM(s) IV Intermittent every 12 hours    Allergies    No Known Allergies    Intolerances        VITALS:  Vital Signs Last 24 Hrs  T(C): 36.7 (20 Jun 2021 13:52), Max: 37 (19 Jun 2021 21:42)  T(F): 98.1 (20 Jun 2021 13:52), Max: 98.6 (19 Jun 2021 21:42)  HR: 57 (20 Jun 2021 13:52) (55 - 69)  BP: 105/62 (20 Jun 2021 13:52) (105/62 - 124/73)  BP(mean): --  RR: 18 (20 Jun 2021 13:52) (16 - 18)  SpO2: 99% (20 Jun 2021 13:52) (96% - 99%)    LABS/DIAGNOSTIC TESTS:                          11.9   8.17  )-----------( 294      ( 19 Jun 2021 12:12 )             34.8         06-19    140  |  107  |  14  ----------------------------<  122<H>  3.5   |  28  |  0.84    Ca    8.7      19 Jun 2021 06:24    TPro  7.3  /  Alb  3.1<L>  /  TBili  0.3  /  DBili  x   /  AST  27  /  ALT  47  /  AlkPhos  99  06-18      LIVER FUNCTIONS - ( 18 Jun 2021 20:55 )  Alb: 3.1 g/dL / Pro: 7.3 g/dL / ALK PHOS: 99 U/L / ALT: 47 U/L DA / AST: 27 U/L / GGT: x             CULTURES: .Surgical Swab posterior neck abscess culture  06-19 @ 03:29   Moderate Staphylococcus aureus  --  --      .Urine Clean Catch (Midstream)  06-16 @ 18:21   No growth  --  --      .Blood Blood-Peripheral  06-16 @ 13:24   No growth to date.  --  --            RADIOLOGY:      ROS:  [  ] UNABLE TO ELICIT

## 2021-06-20 NOTE — PROGRESS NOTE ADULT - SUBJECTIVE AND OBJECTIVE BOX
Interval Events:  pt in nad    Allergies    No Known Allergies    Intolerances      Endocrine/Metabolic Medications:  insulin glargine Injectable (LANTUS) 10 Unit(s) SubCutaneous at bedtime  insulin lispro (ADMELOG) corrective regimen sliding scale   SubCutaneous Before meals and at bedtime  metFORMIN 500 milliGRAM(s) Oral two times a day      Vital Signs Last 24 Hrs  T(C): 36.7 (20 Jun 2021 05:19), Max: 37.1 (19 Jun 2021 14:16)  T(F): 98 (20 Jun 2021 05:19), Max: 98.7 (19 Jun 2021 14:16)  HR: 55 (20 Jun 2021 05:19) (55 - 69)  BP: 113/69 (20 Jun 2021 05:19) (113/69 - 124/73)  BP(mean): --  RR: 18 (20 Jun 2021 05:19) (16 - 18)  SpO2: 99% (20 Jun 2021 05:19) (96% - 99%)      PHYSICAL EXAM  All physical exam findings normal, except those marked:  General:	Alert, active, cooperative, NAD, well hydrated  .		[] Abnormal:  Neck		Normal: supple, no cervical adenopathy, no palpable thyroid  .		[] Abnormal:  Cardiovascular	Normal: regular rate, normal S1, S2, no murmurs  .		[] Abnormal:  Respiratory	Normal: no chest wall deformity, normal respiratory pattern, CTA B/L  .		[] Abnormal:  Abdominal	Normal: soft, ND, NT, bowel sounds present, no masses, no organomegaly  .		[] Abnormal:  		Normal normal genitalia, testes descended, circumcised/uncircumcised  .		Digna stage:			Breast digna:  .		Menstrual history:  .		[] Abnormal:  Extremities	Normal: FROM x4  .		[] Abnormal:  Skin		Normal: intact and not indurated, no rash, no acanthosis nigricans  .		[] Abnormal:  Neurologic	Normal: grossly intact  .		[] Abnormal:    LABS                        11.9   8.17  )-----------( 294      ( 19 Jun 2021 12:12 )             34.8         CAPILLARY BLOOD GLUCOSE      POCT Blood Glucose.: 128 mg/dL (20 Jun 2021 07:40)  POCT Blood Glucose.: 183 mg/dL (19 Jun 2021 22:02)  POCT Blood Glucose.: 177 mg/dL (19 Jun 2021 16:52)  POCT Blood Glucose.: 180 mg/dL (19 Jun 2021 11:19)        Assesment/plan    Patient is a 41 year old male with PMHx of T2DM (not on medications) who is coming with complaints of posterior neck swelling, pain and discharge for the past 3 days. Found to have uncont dm. Pt was told to d/c metformin as dm was well controlled about 4-5 yrs ago. No recent md visit       Problem/Recommendation - 1:  Problem: Uncontrolled type 2 diabetes mellitus with hyperglycemia. Recommendation: due to noncompliance and cellulitis  cont lantus to 10 units   admelog 4 ac tid- d/amy  metformin 500 bid   dm teaching   nutrition eval  fsg ac and hs   d/w hs.     Problem/Recommendation - 2:  ·  Problem: Cellulitis of neck.  Recommendation: cont iv abx   tx per surgery/ prim team.

## 2021-06-21 ENCOUNTER — TRANSCRIPTION ENCOUNTER (OUTPATIENT)
Age: 41
End: 2021-06-21

## 2021-06-21 VITALS
RESPIRATION RATE: 18 BRPM | TEMPERATURE: 99 F | DIASTOLIC BLOOD PRESSURE: 78 MMHG | SYSTOLIC BLOOD PRESSURE: 129 MMHG | HEART RATE: 86 BPM | OXYGEN SATURATION: 98 %

## 2021-06-21 LAB
-  AMPICILLIN/SULBACTAM: SIGNIFICANT CHANGE UP
-  CEFAZOLIN: SIGNIFICANT CHANGE UP
-  CLINDAMYCIN: SIGNIFICANT CHANGE UP
-  ERYTHROMYCIN: SIGNIFICANT CHANGE UP
-  GENTAMICIN: SIGNIFICANT CHANGE UP
-  OXACILLIN: SIGNIFICANT CHANGE UP
-  PENICILLIN: SIGNIFICANT CHANGE UP
-  RIFAMPIN: SIGNIFICANT CHANGE UP
-  TETRACYCLINE: SIGNIFICANT CHANGE UP
-  TRIMETHOPRIM/SULFAMETHOXAZOLE: SIGNIFICANT CHANGE UP
-  VANCOMYCIN: SIGNIFICANT CHANGE UP
CULTURE RESULTS: SIGNIFICANT CHANGE UP
CULTURE RESULTS: SIGNIFICANT CHANGE UP
GLUCOSE BLDC GLUCOMTR-MCNC: 148 MG/DL — HIGH (ref 70–99)
GLUCOSE BLDC GLUCOMTR-MCNC: 155 MG/DL — HIGH (ref 70–99)
METHOD TYPE: SIGNIFICANT CHANGE UP
SPECIMEN SOURCE: SIGNIFICANT CHANGE UP
SPECIMEN SOURCE: SIGNIFICANT CHANGE UP

## 2021-06-21 PROCEDURE — 87040 BLOOD CULTURE FOR BACTERIA: CPT

## 2021-06-21 PROCEDURE — 86769 SARS-COV-2 COVID-19 ANTIBODY: CPT

## 2021-06-21 PROCEDURE — 85025 COMPLETE CBC W/AUTO DIFF WBC: CPT

## 2021-06-21 PROCEDURE — 86900 BLOOD TYPING SEROLOGIC ABO: CPT

## 2021-06-21 PROCEDURE — 82306 VITAMIN D 25 HYDROXY: CPT

## 2021-06-21 PROCEDURE — 93005 ELECTROCARDIOGRAM TRACING: CPT

## 2021-06-21 PROCEDURE — 86850 RBC ANTIBODY SCREEN: CPT

## 2021-06-21 PROCEDURE — 87635 SARS-COV-2 COVID-19 AMP PRB: CPT

## 2021-06-21 PROCEDURE — 87070 CULTURE OTHR SPECIMN AEROBIC: CPT

## 2021-06-21 PROCEDURE — 84443 ASSAY THYROID STIM HORMONE: CPT

## 2021-06-21 PROCEDURE — 70491 CT SOFT TISSUE NECK W/DYE: CPT

## 2021-06-21 PROCEDURE — 85730 THROMBOPLASTIN TIME PARTIAL: CPT

## 2021-06-21 PROCEDURE — 85027 COMPLETE CBC AUTOMATED: CPT

## 2021-06-21 PROCEDURE — 80061 LIPID PANEL: CPT

## 2021-06-21 PROCEDURE — 80053 COMPREHEN METABOLIC PANEL: CPT

## 2021-06-21 PROCEDURE — 82746 ASSAY OF FOLIC ACID SERUM: CPT

## 2021-06-21 PROCEDURE — 99285 EMERGENCY DEPT VISIT HI MDM: CPT | Mod: 25

## 2021-06-21 PROCEDURE — 82607 VITAMIN B-12: CPT

## 2021-06-21 PROCEDURE — 36415 COLL VENOUS BLD VENIPUNCTURE: CPT

## 2021-06-21 PROCEDURE — 86901 BLOOD TYPING SEROLOGIC RH(D): CPT

## 2021-06-21 PROCEDURE — 99239 HOSP IP/OBS DSCHRG MGMT >30: CPT | Mod: GC

## 2021-06-21 PROCEDURE — 83735 ASSAY OF MAGNESIUM: CPT

## 2021-06-21 PROCEDURE — 83605 ASSAY OF LACTIC ACID: CPT

## 2021-06-21 PROCEDURE — 85610 PROTHROMBIN TIME: CPT

## 2021-06-21 PROCEDURE — 82962 GLUCOSE BLOOD TEST: CPT

## 2021-06-21 PROCEDURE — 87186 SC STD MICRODIL/AGAR DIL: CPT

## 2021-06-21 PROCEDURE — 81001 URINALYSIS AUTO W/SCOPE: CPT

## 2021-06-21 PROCEDURE — 80048 BASIC METABOLIC PNL TOTAL CA: CPT

## 2021-06-21 PROCEDURE — 83036 HEMOGLOBIN GLYCOSYLATED A1C: CPT

## 2021-06-21 PROCEDURE — 87389 HIV-1 AG W/HIV-1&-2 AB AG IA: CPT

## 2021-06-21 PROCEDURE — 87086 URINE CULTURE/COLONY COUNT: CPT

## 2021-06-21 PROCEDURE — 84100 ASSAY OF PHOSPHORUS: CPT

## 2021-06-21 RX ORDER — METFORMIN HYDROCHLORIDE 850 MG/1
1 TABLET ORAL
Qty: 60 | Refills: 0
Start: 2021-06-21 | End: 2021-07-20

## 2021-06-21 RX ORDER — CLINDAMYCIN PHOSPHATE GEL USP, 1% 10 MG/G
1 GEL TOPICAL
Qty: 60 | Refills: 0
Start: 2021-06-21 | End: 2021-07-20

## 2021-06-21 RX ORDER — ONDANSETRON 8 MG/1
4 TABLET, FILM COATED ORAL ONCE
Refills: 0 | Status: DISCONTINUED | OUTPATIENT
Start: 2021-06-21 | End: 2021-06-21

## 2021-06-21 RX ADMIN — Medication 2: at 07:56

## 2021-06-21 RX ADMIN — Medication 250 MILLIGRAM(S): at 05:52

## 2021-06-21 RX ADMIN — METFORMIN HYDROCHLORIDE 1000 MILLIGRAM(S): 850 TABLET ORAL at 05:52

## 2021-06-21 NOTE — PROGRESS NOTE ADULT - ASSESSMENT
Carbuncle of neck  Leukocytosis - normalized    Plan - Started on Vancomycin 1gm iv q12hrs  await sensitivities but will plan to DC home tomorrow on po abxs.     Carbuncle of neck  Leukocytosis - normalized    Plan - Continue Vancomycin 1gm iv q12hrs  If ready for discharge today, can go on Augmentin 875mg bid po for 10 days  Will need to apply Cleocin T 1% cream 1 week before and 1 week after getting a haircut   Carbuncle of neck  Leukocytosis - normalized    Plan - Continue Vancomycin 1gm iv q12hrs  If ready for discharge today, can go on Augmentin 875mg bid po for 10 days  Will need to apply Cleocin T 1% cream 1 week before and 1 week after getting a haircut    I agree with above

## 2021-06-21 NOTE — PROGRESS NOTE ADULT - ATTENDING COMMENTS
41 year old male with PMHx of T2DM (not on medications) who is coming with complaints of posterior neck swelling, pain and discharge for the past 3 days, He has Hx DM x 5 yrs was on meds Metformin but according to patient PCP discontinued after he was doing Diet and Exercise. He has not seen a Physician in long time. Has these type of eruptions but never needed antibiotics. Works in construction.     Patient admitted with recurrent Carbuncle infected with pus drainage s/p OR I&D 6/18    Patient is doing well; area around carbuncle appears softer. Sugars much better controlled  denies fever, chills, SOB, palpitations, chest pain, nausea, vomiting, diarrhea, constipation, dizziness  Wound Cx growing S. Aureus    Vital Signs Last 24 Hrs  T(C): 37 (21 Jun 2021 13:20), Max: 37 (21 Jun 2021 13:20)  T(F): 98.6 (21 Jun 2021 13:20), Max: 98.6 (21 Jun 2021 13:20)  HR: 61 (21 Jun 2021 13:20) (61 - 61)  BP: 118/78 (21 Jun 2021 13:20) (118/78 - 118/78)  RR: 18 (21 Jun 2021 13:20) (18 - 18)  SpO2: 98% (21 Jun 2021 13:20) (98% - 98%)    P/E:  Middle aged male, comfortable at rest, NAD  HEENT: Posterior head/ neck hard indurated area , softer with dressing s/p I&D intact  CVS: S1S2 present, regular  Resp: BLAE+, No wheeze or Rhonchi  GI: Soft, BS+, NT  Extr: No edema or calf tenderness    Labs: stable 6/19/21    Culture - Surgical Swab (06.19.21 @ 03:29)   - Ampicillin/Sulbactam: S <=8/4   - Cefazolin: S <=4   - Clindamycin: S <=0.25   - Erythromycin: S <=0.25   - Gentamicin: S <=1 Should not be used as monotherapy   - Oxacillin: S 0.5   - Penicillin: R >8   - RIF- Rifampin: S <=1 Should not be used as monotherapy   - Tetra/Doxy: S <=1   - Trimethoprim/Sulfamethoxazole: S <=0.5/9.5   - Vancomycin: S 2   Specimen Source: .Surgical Swab posterior neck abscess culture   Culture Results: Moderate Staphylococcus aureus     CT Neck Soft Tissue w/ IV Cont (06.16.21 @ 11:22)     IMPRESSION: Dorsal neck cellulitis and myositis. No evidence for abscess at this time. Hypoattenuation of the thickened overlying skin, which could represent phlegmonous change.    D/D:  Infected Carbuncle with superimposed Cellulitis and Myositis s/p I&D POD # 3  Wound Cx S. Aureus  returned MSSA  Uncontrolled Type 2 DM with Hyperglycemia much better controlled due to   Non compliance with medical Rx and possibly cellulitis  Vitamin D deficiency    Plan:  Discussed with ID Dr. Roberts; OR Cx and sensitivity S. Aureus;   Switch to Augmentin x 7 more days to complete 10 day course of antibiotic  Endo follow up appreciated; d/w Dr. Ahmadi given much better sugar control will D/C Lantus and increase Metformin to 1000 mg twice daily  Hold off Insulin for now.    Nutritional consult and Diabetic teaching for Insulin administration. Patient comfortable in injecting insulin as per patient.     Discussed with Patient findings and plan of care; agree with the plan above; D/C HOME. MEDICALLY STABLE  Discussed with Marilyn Whittaker; no packing needed; local wound care with saline and dry dressing; F/U Dr. doe outpatient  Patient will follow up with new PCP outpatient at [facility] with either Dr. Longoria or Dr. Chavez  Discussed with RN  Discussed with ID, MARILYN and PGY1 DR. Worrell  D/C Home

## 2021-06-21 NOTE — PROGRESS NOTE ADULT - PROBLEM SELECTOR PLAN 2
- Pt has history of uncontrolled DM not on any medications   - A1c 12.2  - d/c lantus lispro  -  metformin 1000 bid  - d/c 4 units Premeal admelog   - Accuchecks ACHS   - CC diet  - Endo Dr. Ahmadi

## 2021-06-21 NOTE — PROGRESS NOTE ADULT - PROBLEM SELECTOR PLAN 3
- No DVT PPx needed as pt ambulatory   - GI Ppx: Protonix Qd

## 2021-06-21 NOTE — PROGRESS NOTE ADULT - SUBJECTIVE AND OBJECTIVE BOX
Interval Events:  pt in nad  c/o nausea    Allergies    No Known Allergies    Intolerances      Endocrine/Metabolic Medications:  insulin lispro (ADMELOG) corrective regimen sliding scale   SubCutaneous Before meals and at bedtime  metFORMIN 1000 milliGRAM(s) Oral two times a day      Vital Signs Last 24 Hrs  T(C): 36.8 (21 Jun 2021 05:03), Max: 37.1 (20 Jun 2021 21:07)  T(F): 98.3 (21 Jun 2021 05:03), Max: 98.8 (20 Jun 2021 21:07)  HR: 61 (21 Jun 2021 05:03) (57 - 61)  BP: 121/75 (21 Jun 2021 05:03) (105/62 - 134/80)  BP(mean): --  RR: 18 (21 Jun 2021 05:03) (16 - 18)  SpO2: 96% (21 Jun 2021 05:03) (96% - 99%)      PHYSICAL EXAM  All physical exam findings normal, except those marked:  General:	Alert, active, cooperative, NAD, well hydrated  .		[] Abnormal:  Neck		Normal: supple, no cervical adenopathy, no palpable thyroid  .		[] Abnormal:  Cardiovascular	Normal: regular rate, normal S1, S2, no murmurs  .		[] Abnormal:  Respiratory	Normal: no chest wall deformity, normal respiratory pattern, CTA B/L  .		[] Abnormal:  Abdominal	Normal: soft, ND, NT, bowel sounds present, no masses, no organomegaly  .		[] Abnormal:  		Normal normal genitalia, testes descended, circumcised/uncircumcised  .		Digna stage:			Breast digna:  .		Menstrual history:  .		[] Abnormal:  Extremities	Normal: FROM x4  .		[] Abnormal:  Skin		Normal: intact and not indurated, no rash, no acanthosis nigricans  .		[] Abnormal:  Neurologic	Normal: grossly intact  .		[] Abnormal:    LABS        CAPILLARY BLOOD GLUCOSE      POCT Blood Glucose.: 155 mg/dL (21 Jun 2021 07:44)  POCT Blood Glucose.: 120 mg/dL (20 Jun 2021 21:31)  POCT Blood Glucose.: 108 mg/dL (20 Jun 2021 16:54)  POCT Blood Glucose.: 152 mg/dL (20 Jun 2021 11:42)        Assesment/plan      Patient is a 41 year old male with PMHx of T2DM (not on medications) who is coming with complaints of posterior neck swelling, pain and discharge for the past 3 days. Found to have uncont dm. Pt was told to d/c metformin as dm was well controlled about 4-5 yrs ago. No recent md visit       Problem/Recommendation - 1:  Problem: Uncontrolled type 2 diabetes mellitus with hyperglycemia. Recommendation: due to noncompliance and cellulitis  off of insulin good control  change metformin to 500 bid - as pt with nausea   dm teaching   nutrition eval  fsg ac and hs   d/w hs.  fine tuning as out pt     Problem/Recommendation - 2:  ·  Problem: Cellulitis of neck.  Recommendation: cont iv abx   tx per surgery/ prim team.

## 2021-06-21 NOTE — PROGRESS NOTE ADULT - SUBJECTIVE AND OBJECTIVE BOX
INTERVAL HPI/OVERNIGHT EVENTS:  Pt resting comfortably. No acute complaints.     MEDICATIONS  (STANDING):  ergocalciferol 24126 Unit(s) Oral <User Schedule>  insulin lispro (ADMELOG) corrective regimen sliding scale   SubCutaneous Before meals and at bedtime  metFORMIN 1000 milliGRAM(s) Oral two times a day  ondansetron Injectable 4 milliGRAM(s) IV Push once  vancomycin  IVPB 1000 milliGRAM(s) IV Intermittent every 12 hours    MEDICATIONS  (PRN):  acetaminophen   Tablet .. 650 milliGRAM(s) Oral every 4 hours PRN Mild Pain (1 - 3)      Vital Signs Last 24 Hrs  T(C): 36.8 (21 Jun 2021 05:03), Max: 37.1 (20 Jun 2021 21:07)  T(F): 98.3 (21 Jun 2021 05:03), Max: 98.8 (20 Jun 2021 21:07)  HR: 61 (21 Jun 2021 05:03) (57 - 61)  BP: 121/75 (21 Jun 2021 05:03) (105/62 - 134/80)  BP(mean): --  RR: 18 (21 Jun 2021 05:03) (16 - 18)  SpO2: 96% (21 Jun 2021 05:03) (96% - 99%)    Physical:  General: A&Ox3. NAD.  HEENT: Right occipital wound clean. Minimal active drainage.     LABS:                        11.9   8.17  )-----------( 294      ( 19 Jun 2021 12:12 )             34.8               Culture - Surgical Swab (06.19.21 @ 03:29)    Specimen Source: .Surgical Swab posterior neck abscess culture    Culture Results:   Moderate Staphylococcus aureus

## 2021-06-21 NOTE — PROGRESS NOTE ADULT - SUBJECTIVE AND OBJECTIVE BOX
41y Male is under our care for     REVIEW OF SYSTEMS:  [  ] Not able to elicit  General:	  Chest:	  GI:	  :  Skin:	  Musculoskeletal:	  Neuro:	    MEDS:  vancomycin  IVPB 1000 milliGRAM(s) IV Intermittent every 12 hours    ALLERGIES: Allergies    No Known Allergies    Intolerances        VITALS:  Vital Signs Last 24 Hrs  T(C): 36.8 (21 Jun 2021 05:03), Max: 37.1 (20 Jun 2021 21:07)  T(F): 98.3 (21 Jun 2021 05:03), Max: 98.8 (20 Jun 2021 21:07)  HR: 61 (21 Jun 2021 05:03) (57 - 61)  BP: 121/75 (21 Jun 2021 05:03) (105/62 - 134/80)  BP(mean): --  RR: 18 (21 Jun 2021 05:03) (16 - 18)  SpO2: 96% (21 Jun 2021 05:03) (96% - 99%)      PHYSICAL EXAM:  HEENT:  Neck:  Respiratory:  Cardiovascular:  Gastrointestinal:  Extremities:  Skin:  Ortho:  Neuro:    LABS/DIAGNOSTIC TESTS:                        11.9   8.17  )-----------( 294      ( 19 Jun 2021 12:12 )             34.8     WBC Count: 8.17 K/uL (06-19 @ 12:12)  WBC Count: 9.45 K/uL (06-19 @ 06:24)  WBC Count: 9.11 K/uL (06-18 @ 20:55)  WBC Count: 9.71 K/uL (06-18 @ 07:02)  WBC Count: 12.59 K/uL (06-17 @ 06:10)            CULTURES:   .Surgical Swab posterior neck abscess culture  06-19 @ 03:29   Moderate Staphylococcus aureus  --  --      .Urine Clean Catch (Midstream)  06-16 @ 18:21   No growth  --  --      .Blood Blood-Peripheral  06-16 @ 13:24   No growth to date.  --  --        RADIOLOGY:  no new studies 41y Male is under our care for carbuncle of posterior neck.  Patient was seen sitting comfortably in bed with no acute distress.  Patient remains afebrile and denies any pain or discomfort at this point.      REVIEW OF SYSTEMS:  [  ] Not able to elicit  General: no fevers no malaise  Chest: no cough no sob  GI: no nvd  : no urinary sxs   Skin: no rashes  Musculoskeletal: no trauma no LBP  Neuro: no ha's no dizziness     MEDS:  vancomycin  IVPB 1000 milliGRAM(s) IV Intermittent every 12 hours    ALLERGIES: Allergies    No Known Allergies    Intolerances        VITALS:  Vital Signs Last 24 Hrs  T(C): 36.8 (21 Jun 2021 05:03), Max: 37.1 (20 Jun 2021 21:07)  T(F): 98.3 (21 Jun 2021 05:03), Max: 98.8 (20 Jun 2021 21:07)  HR: 61 (21 Jun 2021 05:03) (57 - 61)  BP: 121/75 (21 Jun 2021 05:03) (105/62 - 134/80)  BP(mean): --  RR: 18 (21 Jun 2021 05:03) (16 - 18)  SpO2: 96% (21 Jun 2021 05:03) (96% - 99%)      PHYSICAL EXAM:  HEENT: n/a  Neck: supple no LN's   Respiratory: lungs clear no rales  Cardiovascular: S1 S2 reg no murmurs  Gastrointestinal: +BS with soft, nondistended abdomen; nontender  Extremities: no edema  Skin: s/p I&D posterior neck abscess- dressing slightly soiled, nontender  Ortho: n/a  Neuro: AAO x 4    LABS/DIAGNOSTIC TESTS:                        11.9   8.17  )-----------( 294      ( 19 Jun 2021 12:12 )             34.8     WBC Count: 8.17 K/uL (06-19 @ 12:12)  WBC Count: 9.45 K/uL (06-19 @ 06:24)  WBC Count: 9.11 K/uL (06-18 @ 20:55)  WBC Count: 9.71 K/uL (06-18 @ 07:02)  WBC Count: 12.59 K/uL (06-17 @ 06:10)            CULTURES:   .Surgical Swab posterior neck abscess culture  06-19 @ 03:29   Moderate Staphylococcus aureus  --  --      .Urine Clean Catch (Midstream)  06-16 @ 18:21   No growth  --  --      .Blood Blood-Peripheral  06-16 @ 13:24   No growth to date.  --  --        RADIOLOGY:  no new studies

## 2021-06-21 NOTE — DISCHARGE NOTE NURSING/CASE MANAGEMENT/SOCIAL WORK - PATIENT PORTAL LINK FT
You can access the FollowMyHealth Patient Portal offered by [facility] by registering at the following website: [email or web address]. By joining [employer]’s FollowMyHealth portal, you will also be able to view your health information using other applications (apps) compatible with our system.

## 2021-06-21 NOTE — PROGRESS NOTE ADULT - SUBJECTIVE AND OBJECTIVE BOX
PGY-1 Progress Note discussed with attending    PAGER #: [[phone]] TILL 5:00 PM  PLEASE CONTACT ON CALL TEAM:  - On Call Team (Please refer to [place]) FROM 5:00 PM - 8:30PM  - Nightfloat Team FROM 8:30 -7:30 AM    CHIEF COMPLAINT & BRIEF HOSPITAL COURSE:  41 year old male with PMHx of T2DM (not on medications) who is coming with complaints of posterior neck swelling, pain and discharge for the past 3 days, admitted for posterior neck carbuncle.    Cellulitis of neck  Frequent episodes of boils in neck as per patient which resolve spontaneously. WBC 12.84. Pt started on unasyn, underwent I&D on 6/18 with Dr. Cerrato. Pt tolerated procedure well. Pt to be discharged on _____ x _ days. ID Dr. Roberts following.    DM 2  Pt has history of uncontrolled DM not on any medications. A1c 12.2 Initially placed on lantus+lispro with improvement in sugars s/p I&D. Pt provided teaching for DM with nutrition consult. Pt to be discharged on Metformin. Dr. Ahmadi on board.    INTERVAL HPI/OVERNIGHT EVENTS:   No acute events overnight. Awaiting surgical culture sensitivities prior to discharge.    REVIEW OF SYSTEMS:  CONSTITUTIONAL: No fever, weight loss, or fatigue  RESPIRATORY: No cough, wheezing, chills or hemoptysis; No shortness of breath  CARDIOVASCULAR: No chest pain, palpitations, dizziness, or leg swelling  GASTROINTESTINAL: No abdominal pain. No nausea, vomiting, or hematemesis; No diarrhea or constipation. No melena or hematochezia.  GENITOURINARY: No dysuria or hematuria, urinary frequency  NEUROLOGICAL: No headaches, memory loss, loss of strength, numbness, or tremors  SKIN: No itching, burning, rashes, or lesions     Vital Signs Last 24 Hrs  T(C): 36.8 (21 Jun 2021 05:03), Max: 37.1 (20 Jun 2021 21:07)  T(F): 98.3 (21 Jun 2021 05:03), Max: 98.8 (20 Jun 2021 21:07)  HR: 61 (21 Jun 2021 05:03) (57 - 61)  BP: 121/75 (21 Jun 2021 05:03) (105/62 - 134/80)  BP(mean): --  RR: 18 (21 Jun 2021 05:03) (16 - 18)  SpO2: 96% (21 Jun 2021 05:03) (96% - 99%)    PHYSICAL EXAMINATION:  GENERAL: NAD, well built  HEAD:  Atraumatic, Normocephalic. (+) posterior head dressed  EYES:  conjunctiva and sclera clear  NECK: Supple, No JVD, Normal thyroid  CHEST/LUNG: Clear to auscultation. Clear to percussion bilaterally; No rales, rhonchi, wheezing, or rubs  HEART: Regular rate and rhythm; No murmurs, rubs, or gallops  ABDOMEN: Soft, Nontender, Nondistended; Bowel sounds present  NERVOUS SYSTEM:  Alert & Oriented X3,    EXTREMITIES:  2+ Peripheral Pulses, No clubbing, cyanosis, or edema  SKIN: warm dry                          11.9   8.17  )-----------( 294      ( 19 Jun 2021 12:12 )             34.8                     CAPILLARY BLOOD GLUCOSE      RADIOLOGY & ADDITIONAL TESTS:

## 2021-06-21 NOTE — PROGRESS NOTE ADULT - PROVIDER SPECIALTY LIST ADULT
Endocrinology
Infectious Disease
Infectious Disease
Surgery
Endocrinology
Surgery
Surgery
Infectious Disease
Surgery
Endocrinology
Infectious Disease
Infectious Disease
Surgery
Surgery
Internal Medicine

## 2021-06-21 NOTE — PROGRESS NOTE ADULT - PROBLEM SELECTOR PLAN 1
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 Roberts   - Surgery Dr. Solis - f/u as outpt

## 2021-06-21 NOTE — PROGRESS NOTE ADULT - ASSESSMENT
41y.o. Male s/p I&D posterior neck abscess POD#3    -complete abx, switch to PO per ID  -Daily dressing change with dry gauze  -May shower daily after removing dressing. Pat dry.

## 2021-06-21 NOTE — PROGRESS NOTE ADULT - NSICDXPILOT_GEN_ALL_CORE
[place]

## 2021-06-21 NOTE — PROGRESS NOTE ADULT - REASON FOR ADMISSION
Posterior neck swelling

## 2021-06-21 NOTE — PROGRESS NOTE ADULT - PROBLEM SELECTOR PROBLEM 2
Uncontrolled type 2 diabetes mellitus with hyperglycemia

## 2021-06-24 LAB
CULTURE RESULTS: SIGNIFICANT CHANGE UP
ORGANISM # SPEC MICROSCOPIC CNT: SIGNIFICANT CHANGE UP
ORGANISM # SPEC MICROSCOPIC CNT: SIGNIFICANT CHANGE UP
SPECIMEN SOURCE: SIGNIFICANT CHANGE UP

## 2022-09-21 ENCOUNTER — EMERGENCY (EMERGENCY)
Facility: HOSPITAL | Age: 42
LOS: 1 days | Discharge: HOME | End: 2022-09-21
Attending: EMERGENCY MEDICINE
Payer: MEDICAID

## 2022-09-21 VITALS
SYSTOLIC BLOOD PRESSURE: 144 MMHG | OXYGEN SATURATION: 100 % | DIASTOLIC BLOOD PRESSURE: 66 MMHG | HEART RATE: 64 BPM | TEMPERATURE: 97 F | RESPIRATION RATE: 18 BRPM

## 2022-09-21 VITALS — WEIGHT: 175.05 LBS | HEIGHT: 69 IN

## 2022-09-21 LAB
ALBUMIN SERPL ELPH-MCNC: 3.6 G/DL — SIGNIFICANT CHANGE UP (ref 3.5–5)
ALP SERPL-CCNC: 83 U/L — SIGNIFICANT CHANGE UP (ref 40–120)
ALT FLD-CCNC: 21 U/L DA — SIGNIFICANT CHANGE UP (ref 10–60)
ANION GAP SERPL CALC-SCNC: 6 MMOL/L — SIGNIFICANT CHANGE UP (ref 5–17)
APPEARANCE UR: CLEAR — SIGNIFICANT CHANGE UP
AST SERPL-CCNC: 15 U/L — SIGNIFICANT CHANGE UP (ref 10–40)
BACTERIA # UR AUTO: ABNORMAL /HPF
BASOPHILS # BLD AUTO: 0.07 K/UL — SIGNIFICANT CHANGE UP (ref 0–0.2)
BASOPHILS NFR BLD AUTO: 1 % — SIGNIFICANT CHANGE UP (ref 0–2)
BILIRUB SERPL-MCNC: 0.4 MG/DL — SIGNIFICANT CHANGE UP (ref 0.2–1.2)
BILIRUB UR-MCNC: NEGATIVE — SIGNIFICANT CHANGE UP
BUN SERPL-MCNC: 13 MG/DL — SIGNIFICANT CHANGE UP (ref 7–18)
CALCIUM SERPL-MCNC: 9 MG/DL — SIGNIFICANT CHANGE UP (ref 8.4–10.5)
CHLORIDE SERPL-SCNC: 105 MMOL/L — SIGNIFICANT CHANGE UP (ref 96–108)
CO2 SERPL-SCNC: 27 MMOL/L — SIGNIFICANT CHANGE UP (ref 22–31)
COLOR SPEC: YELLOW — SIGNIFICANT CHANGE UP
CREAT SERPL-MCNC: 1.05 MG/DL — SIGNIFICANT CHANGE UP (ref 0.5–1.3)
DIFF PNL FLD: NEGATIVE — SIGNIFICANT CHANGE UP
EGFR: 90 ML/MIN/1.73M2 — SIGNIFICANT CHANGE UP
EOSINOPHIL # BLD AUTO: 0.07 K/UL — SIGNIFICANT CHANGE UP (ref 0–0.5)
EOSINOPHIL NFR BLD AUTO: 1 % — SIGNIFICANT CHANGE UP (ref 0–6)
GLUCOSE SERPL-MCNC: 259 MG/DL — HIGH (ref 70–99)
GLUCOSE UR QL: 1000 MG/DL
HCT VFR BLD CALC: 41.4 % — SIGNIFICANT CHANGE UP (ref 39–50)
HGB BLD-MCNC: 14.2 G/DL — SIGNIFICANT CHANGE UP (ref 13–17)
IMM GRANULOCYTES NFR BLD AUTO: 0.1 % — SIGNIFICANT CHANGE UP (ref 0–0.9)
KETONES UR-MCNC: ABNORMAL
LEUKOCYTE ESTERASE UR-ACNC: NEGATIVE — SIGNIFICANT CHANGE UP
LYMPHOCYTES # BLD AUTO: 3.33 K/UL — HIGH (ref 1–3.3)
LYMPHOCYTES # BLD AUTO: 48.7 % — HIGH (ref 13–44)
MCHC RBC-ENTMCNC: 29.4 PG — SIGNIFICANT CHANGE UP (ref 27–34)
MCHC RBC-ENTMCNC: 34.3 GM/DL — SIGNIFICANT CHANGE UP (ref 32–36)
MCV RBC AUTO: 85.7 FL — SIGNIFICANT CHANGE UP (ref 80–100)
MONOCYTES # BLD AUTO: 0.35 K/UL — SIGNIFICANT CHANGE UP (ref 0–0.9)
MONOCYTES NFR BLD AUTO: 5.1 % — SIGNIFICANT CHANGE UP (ref 2–14)
NEUTROPHILS # BLD AUTO: 3.01 K/UL — SIGNIFICANT CHANGE UP (ref 1.8–7.4)
NEUTROPHILS NFR BLD AUTO: 44.1 % — SIGNIFICANT CHANGE UP (ref 43–77)
NITRITE UR-MCNC: NEGATIVE — SIGNIFICANT CHANGE UP
NRBC # BLD: 0 /100 WBCS — SIGNIFICANT CHANGE UP (ref 0–0)
PH UR: 5 — SIGNIFICANT CHANGE UP (ref 5–8)
PLATELET # BLD AUTO: 332 K/UL — SIGNIFICANT CHANGE UP (ref 150–400)
POTASSIUM SERPL-MCNC: 4 MMOL/L — SIGNIFICANT CHANGE UP (ref 3.5–5.3)
POTASSIUM SERPL-SCNC: 4 MMOL/L — SIGNIFICANT CHANGE UP (ref 3.5–5.3)
PROT SERPL-MCNC: 7.6 G/DL — SIGNIFICANT CHANGE UP (ref 6–8.3)
PROT UR-MCNC: 30 MG/DL
RBC # BLD: 4.83 M/UL — SIGNIFICANT CHANGE UP (ref 4.2–5.8)
RBC # FLD: 11.5 % — SIGNIFICANT CHANGE UP (ref 10.3–14.5)
RBC CASTS # UR COMP ASSIST: SIGNIFICANT CHANGE UP /HPF (ref 0–2)
SODIUM SERPL-SCNC: 138 MMOL/L — SIGNIFICANT CHANGE UP (ref 135–145)
SP GR SPEC: 1.02 — SIGNIFICANT CHANGE UP (ref 1.01–1.02)
UROBILINOGEN FLD QL: 1
WBC # BLD: 6.84 K/UL — SIGNIFICANT CHANGE UP (ref 3.8–10.5)
WBC # FLD AUTO: 6.84 K/UL — SIGNIFICANT CHANGE UP (ref 3.8–10.5)
WBC UR QL: SIGNIFICANT CHANGE UP /HPF (ref 0–5)

## 2022-09-21 PROCEDURE — 73630 X-RAY EXAM OF FOOT: CPT | Mod: 26,50

## 2022-09-21 PROCEDURE — 83036 HEMOGLOBIN GLYCOSYLATED A1C: CPT

## 2022-09-21 PROCEDURE — 80053 COMPREHEN METABOLIC PANEL: CPT

## 2022-09-21 PROCEDURE — 36415 COLL VENOUS BLD VENIPUNCTURE: CPT

## 2022-09-21 PROCEDURE — 99284 EMERGENCY DEPT VISIT MOD MDM: CPT | Mod: 25

## 2022-09-21 PROCEDURE — 81001 URINALYSIS AUTO W/SCOPE: CPT

## 2022-09-21 PROCEDURE — 96374 THER/PROPH/DIAG INJ IV PUSH: CPT

## 2022-09-21 PROCEDURE — 73630 X-RAY EXAM OF FOOT: CPT

## 2022-09-21 PROCEDURE — 96361 HYDRATE IV INFUSION ADD-ON: CPT

## 2022-09-21 PROCEDURE — 85025 COMPLETE CBC W/AUTO DIFF WBC: CPT

## 2022-09-21 PROCEDURE — 99284 EMERGENCY DEPT VISIT MOD MDM: CPT

## 2022-09-21 RX ORDER — KETOROLAC TROMETHAMINE 30 MG/ML
15 SYRINGE (ML) INJECTION ONCE
Refills: 0 | Status: DISCONTINUED | OUTPATIENT
Start: 2022-09-21 | End: 2022-09-21

## 2022-09-21 RX ORDER — SODIUM CHLORIDE 9 MG/ML
1000 INJECTION INTRAMUSCULAR; INTRAVENOUS; SUBCUTANEOUS ONCE
Refills: 0 | Status: COMPLETED | OUTPATIENT
Start: 2022-09-21 | End: 2022-09-21

## 2022-09-21 RX ORDER — GABAPENTIN 400 MG/1
100 CAPSULE ORAL ONCE
Refills: 0 | Status: COMPLETED | OUTPATIENT
Start: 2022-09-21 | End: 2022-09-21

## 2022-09-21 RX ADMIN — Medication 15 MILLIGRAM(S): at 21:55

## 2022-09-21 RX ADMIN — SODIUM CHLORIDE 1000 MILLILITER(S): 9 INJECTION INTRAMUSCULAR; INTRAVENOUS; SUBCUTANEOUS at 22:54

## 2022-09-21 RX ADMIN — SODIUM CHLORIDE 1000 MILLILITER(S): 9 INJECTION INTRAMUSCULAR; INTRAVENOUS; SUBCUTANEOUS at 22:00

## 2022-09-21 RX ADMIN — GABAPENTIN 100 MILLIGRAM(S): 400 CAPSULE ORAL at 21:56

## 2022-09-21 RX ADMIN — Medication 15 MILLIGRAM(S): at 22:54

## 2022-09-21 NOTE — ED PROVIDER NOTE - PATIENT PORTAL LINK FT
You can access the FollowMyHealth Patient Portal offered by [facility] by registering at the following website: [email or web address]. By joining [employer]’s FollowMyHealth portal, you will also be able to view your health information using other applications (apps) compatible with our system.

## 2022-09-21 NOTE — ED PROVIDER NOTE - OBJECTIVE STATEMENT
42 year old male with PMHX of diabetes mellitus (non compliant with meds) presents to the ED with complaints of bilateral foot pain. Patient states fee are burning with pain and today pain is intolerable thus came to ER. Patient denies fevers, chills, numbness, weakness. NKDA.

## 2022-09-21 NOTE — ED ADULT NURSE NOTE - OBJECTIVE STATEMENT
pt is a 41 y/o  male  with  c/o  bilateral  foot pain x 6 months  on and off... pt able to walk  in intake  without  difficulty. pt  w/  + pedal  pulses  noted.  and  w/ + sensation on both  feet.

## 2022-09-21 NOTE — ED ADULT NURSE REASSESSMENT NOTE - NS ED NURSE REASSESS COMMENT FT1
2255 PM - pt  discharge  home  with complete dc instruction given to pt  pt went home ambulating  with VSS.

## 2022-09-21 NOTE — ED PROVIDER NOTE - PRINCIPAL DIAGNOSIS
Pt dropped a cup, which shattered and cut her right shin. Cleaned at home with peroxide. Came here due to concern for keloid formation. Last tetanus 2 years ago when she cut her hand. Denies any other injuries Pain in both feet

## 2022-09-21 NOTE — ED PROVIDER NOTE - DR. NAME
Neulasta on-body injector: Yes:   Writer reviewed with patient on Neulasta On-Body Injector  Patient aware that flashing green light means injector is working properly.  Patient aware that red light means injector is not working properly and they need to call clinic.  Patient aware that medication will inject 27 hours after it is applied. Medication will inject at 1400.  Patient is not to soak/bathe while injector is in place.  Patient should avoid traveling, driving or operating heavy machinery during hour 26 through hour 29 after the On-Body Injector is applied.  Patient should avoid sleeping on the On-Body Injector or applying pressure on the On-Body Injector.  The On-Body Injector may not work properly.  Patient should keep the On-Body Injector at least 4 inches away from electrical equipment such as cell phones, cordless telephones, microwaves and other common appliances.  If the On-Body Injector is too close to electrical equipment, it may not work correctly and can lead to a missed or incomplete dose.  Patient aware it will take 45 minutes for medication to inject.   Patient is to check occasionally to make sure On-Body Injector is flashing a green light.  Patient is to make sure On-Body Injector is not leaking - if it leaks patient is to call clinic.  Patient is aware that if On-Body Injector falls off, he/she should not replace it on to his/her body. He/she should call clinic immediately.  Once medication is completely instilled, the light will be solid green or pump will turn off and patient can remove On-Body Injector.  Patient to place empty On-Body Injector into a sharps container.    Drug Name: FOLFOX + bevacizumab + OnPro  Cycle/Day: C9D3  ECOG [09/14/22 1105]   ECOG Performance Status 0       Oral Chemotherapy: No  Nursing Assessment:  A comprehensive nursing assessment was performed and the patient reports the following:    Anxiety/Depression/Insomnia: Anxiety: No, Depression: No and Insomnia:  No  Pain: NO    Toxicity Assessment    Auditory/Ear  Assessment: Yes (Within Defined Limits)    Cardiac General  Assessment: Yes (Within Defined Limits)    Constitutional  Assessment: Yes (Within Defined Limits)    Dermatology/Skin  Assessment: Yes (Within Defined Limits)    Endocrine  Assessment: Yes (Within Defined Limits)    Gastrointestinal  Assessment: Yes (Within Defined Limits)    Hemorrhage/Bleeding  Assessment: Yes (Within Defined Limits)    Infection  Assessment: Yes (Within Defined Limits)    Lymphatics  Assessment: Yes (Within Defined Limits)    Musculoskeletal  Assessment: Yes (Within Defined Limits)    Neurology  Assessment: Yes (Within Defined Limits)    Ocular  Assessment: Yes (Within Defined Limits)    Pain  Assessment: Yes (Within Defined Limits)    Pulmonary/Upper Respiratory  Assessment: Yes (Within Defined Limits)    Genitourinary  Assessment: Yes (Within Defined Limits)        Infusion Pump Details: See Documentation Flowsheet   Central Line Care- See LDA in [employer]    Does the Patient have a central line? yes:   Device: Port  Central Line Site: Chest  Central Line Site Appearance: WDL  Is this a port? Yes: Implanted port accessed using a 20 gauge non-coring needle primed with 0.9% sodium chloride. Good blood return obtained.  Blood not collected.  Site Care: Aseptic site care per protocol, Sterile gauze and tape dressing applied and Injection caps changed/applied  Comments:   Central line flushed with: 20 ml 0.9 normal saline and 100 un/ml- 500 units heparin  Central line removed: no  Hall Needle: Hall needle de-accessed        Discharged: Stable and Follow up appointment scheduled  Future Appointments   Date Time Provider Department Center   10/3/2022  8:45 AM [facility]   10/3/2022  9:15 AM Trevor Germain MD [address]          Dr. Alejo is supervising clinician today.      Mehdi Aguila (Attending)

## 2022-09-21 NOTE — ED PROVIDER NOTE - NSFOLLOWUPINSTRUCTIONS_ED_ALL_ED_FT
Diabetic Neuropathy      Diabetic neuropathy refers to nerve damage that is caused by diabetes. Over time, people with diabetes can develop nerve damage throughout the body. There are several types of diabetic neuropathy:  •Peripheral neuropathy. This is the most common type of diabetic neuropathy. It damages the nerves that carry signals between the spinal cord and other parts of the body (peripheral nerves). This usually affects nerves in the feet, legs, hands, and arms.      •Autonomic neuropathy. This type causes damage to nerves that control involuntary functions (autonomic nerves). Involuntary functions are functions of the body that you do not control. They include heartbeat, body temperature, blood pressure, urination, digestion, sweating, sexual function, or response to changes in blood glucose.      •Focal neuropathy. This type of nerve damage affects one area of the body, such as an arm, a leg, or the face. The injury may involve one nerve or a small group of nerves. Focal neuropathy can be painful and unpredictable. It occurs most often in older adults with diabetes. This often develops suddenly, but usually improves over time and does not cause long-term problems.      •Proximal neuropathy. This type of nerve damage affects the nerves of the thighs, hips, buttocks, or legs. It causes severe pain, weakness, and muscle death (atrophy), usually in the thigh muscles. It is more common among older men and people who have type 2 diabetes. The length of recovery time may vary.        What are the causes?    Peripheral, autonomic, and focal neuropathies are caused by diabetes that is not well controlled with treatment. The cause of proximal neuropathy is not known, but it may be caused by inflammation related to uncontrolled blood glucose levels.      What are the signs or symptoms?    Peripheral neuropathy     Peripheral neuropathy develops slowly over time. When the nerves of the feet and legs no longer work, you may experience:  •Burning, stabbing, or aching pain in the legs or feet.      •Pain or cramping in the legs or feet.    •Loss of feeling (numbness) and inability to feel pressure or pain in the feet. This can lead to:  •Thick calluses or sores on areas of constant pressure.      •Ulcers.      •Reduced ability to feel temperature changes.        •Foot deformities.      •Muscle weakness.      •Loss of balance or coordination.      Autonomic neuropathy    The symptoms of autonomic neuropathy vary depending on which nerves are affected. Symptoms may include:•Problems with digestion, such as:  •Nausea or vomiting.      •Poor appetite.      •Bloating.      •Diarrhea or constipation.      •Trouble swallowing.      •Losing weight without trying to.      •Problems with the heart, blood, and lungs, such as:  •Dizziness, especially when standing up.      •Fainting.      •Shortness of breath.      •Irregular heartbeat.      •Bladder problems, such as:  •Trouble starting or stopping urination.      •Leaking urine.      •Trouble emptying the bladder.      •Urinary tract infections (UTIs).      •Problems with other body functions, such as:  •Sweat. You may sweat too much or too little.      •Temperature. You might get hot easily. Or, you might feel cold more than usual.      •Sexual function. Men may not be able to get or maintain an erection. Women may have vaginal dryness and difficulty with arousal.        Focal neuropathy    Symptoms affect only one area of the body. Common symptoms include:  •Numbness.      •Tingling.      •Burning pain.      •Prickling feeling.      •Very sensitive skin.      •Weakness.      •Inability to move (paralysis).      •Muscle twitching.      •Muscles getting smaller (wasting).      •Poor coordination.      •Double or blurred vision.      Proximal neuropathy    •Sudden, severe pain in the hip, thigh, or buttocks. Pain may spread from the back into the legs (sciatica).      •Pain and numbness in the arms and legs.      •Tingling.      •Loss of bladder control or bowel control.      •Weakness and wasting of thigh muscles.      •Difficulty getting up from a seated position.      •Abdominal swelling.      •Unexplained weight loss.        How is this diagnosed?    Diagnosis varies depending on the type of neuropathy your health care provider suspects.    Peripheral neuropathy     Your health care provider will do a neurologic exam. This exam checks your reflexes, how you move, and what you can feel. You may have other tests, such as:  •Blood tests.      •Tests of the fluid that surrounds the spinal cord (lumbar puncture).      •CT scan.      •MRI.      •Checking the nerves that control muscles (electromyogram, or EMG).      •Checking how quickly signals pass through your nerves (nerve conduction study).      •Checking a small piece of a nerve using a microscope (biopsy).      Autonomic neuropathy    You may have tests, such as:  •Tests to measure your blood pressure and heart rate. You may be secured to an exam table that moves you from a lying position to an upright position (table tilt test).      •Breathing tests to check your lungs.      •Tests to check how food moves through the digestive system (gastric emptying tests).      •Blood, sweat, or urine tests.      •Ultrasound of your bladder.      •Spinal fluid tests.      Focal neuropathy    This condition may be diagnosed with:  •A neurologic exam.      •CT scan.      •MRI.      •EMG.      •Nerve conduction study.      Proximal neuropathy    There is no test to diagnose this type of neuropathy. You may have tests to rule out other possible causes of this type of neuropathy. Tests may include:  •X-rays of your spine and lumbar region.      •Lumbar puncture.      •MRI.        How is this treated?    The goal of treatment is to keep nerve damage from getting worse. Treatment may include:  •Following your diabetes management plan. This will help keep your blood glucose level and your A1C level within your target range. This is the most important treatment.      •Using prescription pain medicine.        Follow these instructions at home:      Diabetes management      Follow your diabetes management plan as told by your health care provider.  •Check your blood glucose levels.      •Keep your blood glucose in your target range.      •Have your A1C level checked at least two times a year, or as often as told.      •Take over the counter and prescription medicines only as told by your health care provider. This includes insulin and diabetes medicine.        Lifestyle      • Do not use any products that contain nicotine or tobacco, such as cigarettes, e-cigarettes, and chewing tobacco. If you need help quitting, ask your health care provider.      •Be physically active every day. Include strength training and balance exercises.      •Follow a healthy meal plan.      •Work with your health care provider to manage your blood pressure.      General instructions     •Ask your health care provider if the medicine prescribed to you requires you to avoid driving or using machinery.      •Check your skin and feet every day for cuts, bruises, redness, blisters, or sores.      •Keep all follow-up visits. This is important.        Contact a health care provider if:    •You have burning, stabbing, or aching pain in your legs or feet.      •You are unable to feel pressure or pain in your feet.    •You develop problems with digestion, such as:  •Nausea.      •Vomiting.      •Bloating.      •Constipation.      •Diarrhea.      •Abdominal pain.      •You have difficulty with urination, such as:  •Inability to control when you urinate (incontinence).      •Inability to completely empty the bladder (retention).        •You feel as if your heart is racing (palpitations).      •You feel dizzy, weak, or faint when you stand up.        Get help right away if:    •You cannot urinate.      •You have sudden weakness or loss of coordination.      •You have trouble speaking.      •You have pain or pressure in your chest.      •You have an irregular heartbeat.      •You have sudden inability to move a part of your body.      These symptoms may represent a serious problem that is an emergency. Do not wait to see if the symptoms will go away. Get medical help right away. Call your local emergency services (911 in the [place] Do not drive yourself to the hospital.       Summary    •Diabetic neuropathy is nerve damage that is caused by diabetes. It can cause numbness and pain in the arms, legs, digestive tract, heart, and other body systems.      •This condition is treated by keeping your blood glucose level and your A1C level within your target range. This can help prevent neuropathy from getting worse.      •Check your skin and feet every day for cuts, bruises, redness, blisters, or sores.      • Do not use any products that contain nicotine or tobacco, such as cigarettes, e-cigarettes, and chewing tobacco.      This information is not intended to replace advice given to you by your health care provider. Make sure you discuss any questions you have with your health care provider.

## 2022-09-21 NOTE — ED PROVIDER NOTE - CLINICAL SUMMARY MEDICAL DECISION MAKING FREE TEXT BOX
Plan is to get labs to check for diabetes mellitus, A1C, X-ray foot and follow up with endocrine and podiatry.

## 2022-09-22 PROBLEM — E11.9 TYPE 2 DIABETES MELLITUS WITHOUT COMPLICATIONS: Chronic | Status: ACTIVE | Noted: 2021-06-16

## 2022-09-22 LAB
A1C WITH ESTIMATED AVERAGE GLUCOSE RESULT: 8 % — HIGH (ref 4–5.6)
ESTIMATED AVERAGE GLUCOSE: 183 MG/DL — HIGH (ref 68–114)

## 2022-10-08 ENCOUNTER — EMERGENCY (EMERGENCY)
Facility: HOSPITAL | Age: 42
LOS: 1 days | Discharge: HOME | End: 2022-10-08
Attending: EMERGENCY MEDICINE
Payer: MEDICAID

## 2022-10-08 VITALS
TEMPERATURE: 99 F | WEIGHT: 188.5 LBS | HEIGHT: 69 IN | OXYGEN SATURATION: 98 % | RESPIRATION RATE: 18 BRPM | DIASTOLIC BLOOD PRESSURE: 83 MMHG | SYSTOLIC BLOOD PRESSURE: 124 MMHG | HEART RATE: 72 BPM

## 2022-10-08 LAB
RAPID RVP RESULT: SIGNIFICANT CHANGE UP
SARS-COV-2 RNA SPEC QL NAA+PROBE: SIGNIFICANT CHANGE UP

## 2022-10-08 PROCEDURE — 0225U NFCT DS DNA&RNA 21 SARSCOV2: CPT

## 2022-10-08 PROCEDURE — 99283 EMERGENCY DEPT VISIT LOW MDM: CPT

## 2022-10-08 NOTE — ED PROVIDER NOTE - NSFOLLOWUPINSTRUCTIONS_ED_ALL_ED_FT
Sinusitis, Adult       Sinusitis is inflammation of your sinuses. Sinuses are hollow spaces in the bones around your face. Your sinuses are located:  •Around your eyes.      •In the middle of your forehead.      •Behind your nose.      •In your cheekbones.      Mucus normally drains out of your sinuses. When your nasal tissues become inflamed or swollen, mucus can become trapped or blocked. This allows bacteria, viruses, and fungi to grow, which leads to infection. Most infections of the sinuses are caused by a virus.    Sinusitis can develop quickly. It can last for up to 4 weeks (acute) or for more than 12 weeks (chronic). Sinusitis often develops after a cold.      What are the causes?    This condition is caused by anything that creates swelling in the sinuses or stops mucus from draining. This includes:  •Allergies.      •Asthma.      •Infection from bacteria or viruses.      •Deformities or blockages in your nose or sinuses.      •Abnormal growths in the nose (nasal polyps).      •Pollutants, such as chemicals or irritants in the air.      •Infection from fungi (rare).        What increases the risk?    You are more likely to develop this condition if you:  •Have a weak body defense system (immune system).      •Do a lot of swimming or diving.      •Overuse nasal sprays.      •Smoke.        What are the signs or symptoms?    The main symptoms of this condition are pain and a feeling of pressure around the affected sinuses. Other symptoms include:  •Stuffy nose or congestion.      •Thick drainage from your nose.      •Swelling and warmth over the affected sinuses.      •Headache.      •Upper toothache.      •A cough that may get worse at night.      •Extra mucus that collects in the throat or the back of the nose (postnasal drip).      •Decreased sense of smell and taste.      •Fatigue.      •A fever.      •Sore throat.      •Bad breath.        How is this diagnosed?    This condition is diagnosed based on:  •Your symptoms.      •Your medical history.      •A physical exam.    •Tests to find out if your condition is acute or chronic. This may include:  •Checking your nose for nasal polyps.      •Viewing your sinuses using a device that has a light (endoscope).      •Testing for allergies or bacteria.      •Imaging tests, such as an MRI or CT scan.        In rare cases, a bone biopsy may be done to rule out more serious types of fungal sinus disease.      How is this treated?    Treatment for sinusitis depends on the cause and whether your condition is chronic or acute.•If caused by a virus, your symptoms should go away on their own within 10 days. You may be given medicines to relieve symptoms. They include:  •Medicines that shrink swollen nasal passages (topical intranasal decongestants).       •Medicines that treat allergies (antihistamines).      •A spray that eases inflammation of the nostrils (topical intranasal corticosteroids).      •Rinses that help get rid of thick mucus in your nose (nasal saline washes).      •If caused by bacteria, your health care provider may recommend waiting to see if your symptoms improve. Most bacterial infections will get better without antibiotic medicine. You may be given antibiotics if you have:  •A severe infection.       •A weak immune system.        •If caused by narrow nasal passages or nasal polyps, you may need to have surgery.        Follow these instructions at home:    Medicines     •Take, use, or apply over-the-counter and prescription medicines only as told by your health care provider. These may include nasal sprays.      •If you were prescribed an antibiotic medicine, take it as told by your health care provider. Do not stop taking the antibiotic even if you start to feel better.        Hydrate and humidify      •Drink enough fluid to keep your urine pale yellow. Staying hydrated will help to thin your mucus.      •Use a cool mist humidifier to keep the humidity level in your home above 50%.      •Inhale steam for 10–15 minutes, 3–4 times a day, or as told by your health care provider. You can do this in the bathroom while a hot shower is running.      •Limit your exposure to cool or dry air.      Rest     •Rest as much as possible.      •Sleep with your head raised (elevated).      •Make sure you get enough sleep each night.        General instructions      •Apply a warm, moist washcloth to your face 3–4 times a day or as told by your health care provider. This will help with discomfort.      •Wash your hands often with soap and water to reduce your exposure to germs. If soap and water are not available, use hand .      • Do not smoke. Avoid being around people who are smoking (secondhand smoke).      •Keep all follow-up visits as told by your health care provider. This is important.        Contact a health care provider if:    •You have a fever.      •Your symptoms get worse.      •Your symptoms do not improve within 10 days.        Get help right away if:    •You have a severe headache.      •You have persistent vomiting.      •You have severe pain or swelling around your face or eyes.      •You have vision problems.      •You develop confusion.      •Your neck is stiff.      •You have trouble breathing.        Summary    •Sinusitis is soreness and inflammation of your sinuses. Sinuses are hollow spaces in the bones around your face.      •This condition is caused by nasal tissues that become inflamed or swollen. The swelling traps or blocks the flow of mucus. This allows bacteria, viruses, and fungi to grow, which leads to infection.      •If you were prescribed an antibiotic medicine, take it as told by your health care provider. Do not stop taking the antibiotic even if you start to feel better.      •Keep all follow-up visits as told by your health care provider. This is important.      This information is not intended to replace advice given to you by your health care provider. Make sure you discuss any questions you have with your health care provider.

## 2022-10-08 NOTE — ED PROVIDER NOTE - PATIENT PORTAL LINK FT
You can access the FollowMyHealth Patient Portal offered by [facility] by registering at the following website: [email or web address]. By joining [employer]’s FollowMyHealth portal, you will also be able to view your health information using other applications (apps) compatible with our system.

## 2022-10-08 NOTE — ED ADULT NURSE NOTE - OBJECTIVE STATEMENT
pt is a 41 y/o  male with c/o  ear pain and  sore throat and  nose pain  x  1 week  denies any fever  and  with  patent  airway  able to  speak in full sentences.

## 2022-10-08 NOTE — ED PROVIDER NOTE - ATTENDING APP SHARED VISIT CONTRIBUTION OF CARE
41 yo Male h/o DMII p/w sinus pressure and congestion  no fevers or HA  ttp maxillary sinus  RVP neg  tx with PO abx, decongestant prn

## 2022-10-08 NOTE — ED PROVIDER NOTE - NS ED ATTENDING STATEMENT MOD
Form faxed and sent to scanning.   This was a shared visit with the O[facility]. I reviewed and verified the documentation and independently performed the documented:

## 2022-10-08 NOTE — ED PROVIDER NOTE - OBJECTIVE STATEMENT
42 year old male with no significant PMHx presents to the ED with complaints of 2 days of right sinus pressure and pressure to ALICE ears, worse on the right, as well as mild throat discomfort. Denies fevers, chills, chest pain, shortness of breath or other symptoms.

## 2022-10-08 NOTE — ED PROVIDER NOTE - CLINICAL SUMMARY MEDICAL DECISION MAKING FREE TEXT BOX
42 year old male presents to the ED with likely sinus infection. Will get RVP and start patient on antibiotics. Will give return precaution.

## 2022-12-28 NOTE — ED ADULT TRIAGE NOTE - SOURCE OF INFORMATION
MARY KAY Espinal   [facility]  [address] [place] [address]  [phone]      PATIENT NAME: Vernon Trevizo  : 1984  DATE: 22  MRN: [id number]      Billing Provider: MARY KAY Espinal  Level of Service:   Patient PCP Information       Provider PCP Type    Izabella Del Cid MD General            Reason for Visit / Chief Complaint: Nasal Congestion (With drainage. NO headache, body aches, no chills) and Cough       Update PCP  Update Chief Complaint         History of Present Illness / Problem Focused Workflow     Vernon Trevizo presents to the clinic with Nasal Congestion (With drainage. NO headache, body aches, no chills) and Cough     PP with c/o several day hx of uri s/s --nasal d/c now purulent. Any temp is low grade.    Cough  This is a new problem. The current episode started in the past 7 days. The problem has been gradually worsening. The cough is Non-productive. Associated symptoms include ear congestion, nasal congestion, postnasal drip and rhinorrhea. Pertinent negatives include no chest pain, ear pain, fever or shortness of breath. The symptoms are aggravated by lying down. He has tried OTC cough suppressant and body position changes for the symptoms. The treatment provided no relief. There is no history of environmental allergies.     Review of Systems     Review of Systems   Constitutional:  Negative for appetite change, diaphoresis and fever.   HENT:  Positive for congestion, postnasal drip, rhinorrhea and sinus pressure. Negative for ear pain.    Respiratory:  Positive for cough. Negative for shortness of breath.    Cardiovascular:  Negative for chest pain.   Gastrointestinal:  Negative for nausea and vomiting.   Skin:  Negative for wound.   Allergic/Immunologic: Negative for environmental allergies and food allergies.   Neurological:  Negative for dizziness and weakness.   Hematological:  Does not  bruise/bleed easily.   Psychiatric/Behavioral:  Positive for sleep disturbance. Negative for confusion.      Medical / Social / Family History   History reviewed. No pertinent past medical history.    History reviewed. No pertinent surgical history.    Social History    reports that he has never smoked. He has never used smokeless tobacco. He reports that he does not drink alcohol and does not use drugs.    Family History  's family history is not on file.    Medications and Allergies     Medications  No outpatient medications have been marked as taking for the 12/27/22 encounter (Office Visit) with MARY KAY Espinal.     Current Facility-Administered Medications for the 12/27/22 encounter (Office Visit) with MARY KAY Espinal   Medication Dose Route Frequency Provider Last Rate Last Admin    [COMPLETED] betamethasone acetate-betamethasone sodium phosphate injection 6 mg  6 mg Intramuscular Once Antonieta Pereira FNP   6 mg at 12/27/22 1141    [COMPLETED] cefTRIAXone injection 1 g  1 g Intramuscular 1 time in Clinic/HOD Antonieta Pereira, FNP   1 g at 12/27/22 1142       Allergies  Review of patient's allergies indicates:  No Known Allergies    Physical Examination     Vitals:    12/27/22 0941   BP: (!) 145/95   Pulse: 94   Resp: 20   Temp: 98.1 °F (36.7 °C)     Physical Exam  Constitutional:       Appearance: Normal appearance.   HENT:      Head: Normocephalic.      Right Ear: Tympanic membrane normal.      Left Ear: Tympanic membrane normal.      Nose: Congestion and rhinorrhea present.      Mouth/Throat:      Mouth: Mucous membranes are moist.   Cardiovascular:      Rate and Rhythm: Normal rate and regular rhythm.      Heart sounds: Normal heart sounds.   Pulmonary:      Effort: Pulmonary effort is normal. No respiratory distress.      Breath sounds: Normal breath sounds. No wheezing, rhonchi or rales.   Musculoskeletal:         General: Normal range of motion.   Skin:     General: Skin is warm  and dry.   Neurological:      General: No focal deficit present.      Mental Status: He is alert and oriented to person, place, and time.       Assessment and Plan (including Health Maintenance)      Problem List  Smart Sets  Document Outside HM   :    Plan:   RTC 2 weeks if s/s persist sooner prn      Health Maintenance Due   Topic Date Due    Hepatitis C Screening  Never done    Lipid Panel  Never done    COVID-19 Vaccine (1) Never done    HIV Screening  Never done    TETANUS VACCINE  Never done    Hemoglobin A1c (Diabetic Prevention Screening)  Never done    Influenza Vaccine (1) Never done       Problem List Items Addressed This Visit    None  Visit Diagnoses       Upper respiratory tract infection, unspecified type    -  Primary    Relevant Medications    azithromycin (Z-NAKUL) 250 MG tablet    dexchlorphen-phenylephrine-DM (POLYTUSSIN DM) 1-5-10 mg/5 mL Syrp    cefTRIAXone injection 1 g (Completed)    betamethasone acetate-betamethasone sodium phosphate injection 6 mg (Completed)            The patient has no Health Maintenance topics of status Not Due    No future appointments.         Signature:  Antonieta Pereira, ROMÁNP  [facility]  [address] [place] [id number] [phone]    Date of encounter: 12/27/22   Patient

## 2023-07-09 ENCOUNTER — EMERGENCY (EMERGENCY)
Facility: HOSPITAL | Age: 43
LOS: 1 days | Discharge: HOME | End: 2023-07-09
Attending: STUDENT IN AN ORGANIZED HEALTH CARE EDUCATION/TRAINING PROGRAM
Payer: COMMERCIAL

## 2023-07-09 VITALS
WEIGHT: 205.03 LBS | TEMPERATURE: 98 F | DIASTOLIC BLOOD PRESSURE: 84 MMHG | HEART RATE: 66 BPM | OXYGEN SATURATION: 100 % | HEIGHT: 69 IN | RESPIRATION RATE: 18 BRPM | SYSTOLIC BLOOD PRESSURE: 137 MMHG

## 2023-07-09 PROCEDURE — 73140 X-RAY EXAM OF FINGER(S): CPT

## 2023-07-09 PROCEDURE — 99284 EMERGENCY DEPT VISIT MOD MDM: CPT | Mod: 25

## 2023-07-09 PROCEDURE — 73140 X-RAY EXAM OF FINGER(S): CPT | Mod: 26,RT

## 2023-07-09 PROCEDURE — 11740 EVACUATION SUBUNGUAL HMTMA: CPT

## 2023-07-09 PROCEDURE — 11740 EVACUATION SUBUNGUAL HMTMA: CPT | Mod: F5

## 2023-07-09 PROCEDURE — 99283 EMERGENCY DEPT VISIT LOW MDM: CPT | Mod: 25

## 2023-07-09 RX ORDER — IBUPROFEN 200 MG
600 TABLET ORAL ONCE
Refills: 0 | Status: COMPLETED | OUTPATIENT
Start: 2023-07-09 | End: 2023-07-09

## 2023-07-09 RX ADMIN — Medication 600 MILLIGRAM(S): at 17:02

## 2023-07-09 RX ADMIN — Medication 600 MILLIGRAM(S): at 17:32

## 2023-07-09 NOTE — ED PROVIDER NOTE - PHYSICAL EXAMINATION
Right 1st digit - +subungual hematoma with swelling to distal finger and slight decreased ROM at IP joint.
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
## 2023-07-09 NOTE — ED ADULT NURSE NOTE - NSFALLUNIVINTERV_ED_ALL_ED
Call bell, personal items and telephone in reach/Instruct patient to call for assistance before getting out of bed/chair/stretcher/[place] to call system/Physically safe environment - no spills, clutter or unnecessary equipment/Purposeful proactive rounding/Room/bathroom lighting operational, light cord in reach

## 2023-07-09 NOTE — ED PROVIDER NOTE - CARE PROVIDER_API CALL
Avi Mullins  Orthopaedic Surgery  [address] [place] [address]  Phone: [phone]  Fax: [phone]  Follow Up Time:     Flavio Cespedes  Surgery  [address] [place] [address]  Phone: [phone]  Fax: [phone]  Follow Up Time:
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
## 2023-07-09 NOTE — ED PROVIDER NOTE - PROGRESS NOTE DETAILS
xray with possible fracture. Will apply finger splint. Patient tolerated trephination well. Will have patient follow up with hand specialist. Pt is well appearing walking with steady gait, stable for discharge and follow up without fail with medical doctor. I had a detailed discussion with the patient and/or guardian regarding the historical points, exam findings, and any diagnostic results supporting the discharge diagnosis. Pt educated on care and need for follow up. Strict return instructions and red flag signs and symptoms discussed with patient. Questions answered. Pt shows understanding of discharge information and agrees to follow.

## 2023-07-09 NOTE — ED PROVIDER NOTE - PATIENT PORTAL LINK FT
You can access the FollowMyHealth Patient Portal offered by [facility] by registering at the following website: [email or web address]. By joining [facility]’s FollowMyHealth portal, you will also be able to view your health information using other applications (apps) compatible with our system.

## 2023-07-09 NOTE — ED ADULT NURSE NOTE - CHIEF COMPLAINT QUOTE
Right hand injury with car door x 2 days ago. No ROM deficit noted. Denies numbness/tingling
Continue CPAP 30 fiO2, EPAP of 5 as per home regimen.

## 2023-07-09 NOTE — ED ADULT TRIAGE NOTE - HAVE YOU RECEIVED AT LEAST TWO PFIZER AND/OR MODERNA VACCINATIONS (IN ANY COMBINATION) AND/OR ONE JOHNSON & JOHNSON VACCINATION?
Gen: Alert, NAD, interactive and playful  Head: NC, AT, EOMI, normal lids/conjunctiva  ENT: normal hearing, patent oropharynx without erythema/exudate, uvula midline  Neck: supple, no tenderness/meningismusm FROM, Trachea midline  Pulm: Bilateral clear BS, normal resp effort, no wheeze/stridor/retractions  CV: tachy, no M/R/G, +dist pulses, cap refill <2sec  Abd: soft, NT/ND, +BS, no guarding/rebound tenderness  Mskel: no edema/erythema/cyanosis  Skin: no rash  Neuro: no sensory/motor deficits Yes

## 2023-07-09 NOTE — ED PROVIDER NOTE - CARE PLAN
1 Principal Discharge DX:	Subungual hematoma of finger   Principal Discharge DX:	Subungual hematoma of finger  Secondary Diagnosis:	Finger fracture

## 2023-07-09 NOTE — ED PROVIDER NOTE - CLINICAL SUMMARY MEDICAL DECISION MAKING FREE TEXT BOX
43 year old male with right 1st digit pain s/p car door closing on it on friday. Will obtain xray to r/o finger fracture, give medication for pain and perform trephination.

## 2023-07-09 NOTE — ED PROVIDER NOTE - NS ED ATTENDING STATEMENT MOD
This was a shared visit with the OLY. I reviewed and verified the documentation and independently performed the documented:

## 2023-07-24 PROBLEM — Z00.00 ENCOUNTER FOR PREVENTIVE HEALTH EXAMINATION: Status: ACTIVE | Noted: 2023-07-24

## 2023-07-25 ENCOUNTER — APPOINTMENT (OUTPATIENT)
Dept: ORTHOPEDIC SURGERY | Facility: CLINIC | Age: 43
End: 2023-07-25
Payer: MEDICAID

## 2023-07-25 VITALS — WEIGHT: 208 LBS | HEIGHT: 69 IN | BODY MASS INDEX: 30.81 KG/M2

## 2023-07-25 DIAGNOSIS — S67.01XA CRUSHING INJURY OF RIGHT THUMB, INITIAL ENCOUNTER: ICD-10-CM

## 2023-07-25 DIAGNOSIS — S60.111A CONTUSION OF RIGHT THUMB WITH DAMAGE TO NAIL, INITIAL ENCOUNTER: ICD-10-CM

## 2023-07-25 PROCEDURE — 73140 X-RAY EXAM OF FINGER(S): CPT | Mod: RT

## 2023-07-25 PROCEDURE — 99203 OFFICE O/P NEW LOW 30 MIN: CPT
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
## 2023-07-25 NOTE — ASSESSMENT
[FreeTextEntry1] : The condition was explained to the patient.\par Discussed risk of permanent nail deformity, anticipate 3-4 months for nail plate to grow out and 1 year for full healing of nail.\par - d/c alumafoam splint.\par - demonstrated HEP for thumb IPJ flexion.\par - activity as tolerated.\par \par F/u PRN.

## 2023-07-25 NOTE — IMAGING
[de-identified] : RIGHT HAND\par thumb: 100% subungual hematoma. s/p trephination of nail plate.\par skin intact. mild swelling of thumb.\par TTP to thumb distal phalanx.\par good EPL, mild limited FPL. good finger extension, flex to full fist.\par SILT to median, ulnar, radial distribution. \par brisk cap refill all digits.\par no triggering.\par \par \par OUTSIDE XRAYS OF RIGHT THUMB 7/9/23: no acute displaced fracture or dislocation.

## 2025-01-13 NOTE — ED PROVIDER NOTE - ATTESTATION, MLM
Him/He I have reviewed and confirmed nurses' notes for patient's medications, allergies, medical history, and surgical history.

## 2025-06-18 NOTE — ED PROVIDER NOTE - NS_EDPROVIDERDISPOUSERTYPE_ED_A_ED
Went over discharge instructions in detail with caregiver and patient. Both parties asked appropriate questions and have no further questions or concerns at this time.   Caregiver helping pt get dressed and use urinal at this time.        Scribe Attestation (For Scribes USE Only)... Attending Attestation (For Attendings USE Only).../Scribe Attestation (For Scribes USE Only)...